# Patient Record
Sex: FEMALE | Race: BLACK OR AFRICAN AMERICAN | NOT HISPANIC OR LATINO | Employment: FULL TIME | URBAN - METROPOLITAN AREA
[De-identification: names, ages, dates, MRNs, and addresses within clinical notes are randomized per-mention and may not be internally consistent; named-entity substitution may affect disease eponyms.]

---

## 2023-04-19 LAB — POTASSIUM (MMOL/L) IN SER/PLAS: 4.4 MMOL/L (ref 3.5–5.3)

## 2023-06-27 ENCOUNTER — OFFICE VISIT (OUTPATIENT)
Dept: PRIMARY CARE | Facility: CLINIC | Age: 53
End: 2023-06-27
Payer: COMMERCIAL

## 2023-06-27 ENCOUNTER — LAB (OUTPATIENT)
Dept: LAB | Facility: LAB | Age: 53
End: 2023-06-27
Payer: COMMERCIAL

## 2023-06-27 ENCOUNTER — APPOINTMENT (OUTPATIENT)
Dept: PRIMARY CARE | Facility: CLINIC | Age: 53
End: 2023-06-27
Payer: COMMERCIAL

## 2023-06-27 VITALS
BODY MASS INDEX: 28.25 KG/M2 | SYSTOLIC BLOOD PRESSURE: 161 MMHG | HEIGHT: 67 IN | HEART RATE: 103 BPM | WEIGHT: 180 LBS | DIASTOLIC BLOOD PRESSURE: 93 MMHG | OXYGEN SATURATION: 97 % | TEMPERATURE: 97.5 F

## 2023-06-27 DIAGNOSIS — E11.9 TYPE 2 DIABETES MELLITUS WITHOUT COMPLICATION, WITHOUT LONG-TERM CURRENT USE OF INSULIN (MULTI): ICD-10-CM

## 2023-06-27 DIAGNOSIS — K21.9 GERD WITHOUT ESOPHAGITIS: ICD-10-CM

## 2023-06-27 DIAGNOSIS — M15.9 GENERALIZED OSTEOARTHRITIS OF HAND: Primary | ICD-10-CM

## 2023-06-27 DIAGNOSIS — R07.89 ATYPICAL CHEST PAIN: ICD-10-CM

## 2023-06-27 DIAGNOSIS — E78.49 OTHER HYPERLIPIDEMIA: ICD-10-CM

## 2023-06-27 DIAGNOSIS — Z00.00 ENCOUNTER FOR ROUTINE ADULT HEALTH EXAMINATION WITHOUT ABNORMAL FINDINGS: ICD-10-CM

## 2023-06-27 DIAGNOSIS — R35.0 URINARY FREQUENCY: ICD-10-CM

## 2023-06-27 DIAGNOSIS — K58.9 IRRITABLE BOWEL SYNDROME WITHOUT DIARRHEA: ICD-10-CM

## 2023-06-27 DIAGNOSIS — M79.7 FIBROMYALGIA: ICD-10-CM

## 2023-06-27 PROCEDURE — 3077F SYST BP >= 140 MM HG: CPT | Performed by: INTERNAL MEDICINE

## 2023-06-27 PROCEDURE — 99215 OFFICE O/P EST HI 40 MIN: CPT | Performed by: INTERNAL MEDICINE

## 2023-06-27 PROCEDURE — 1036F TOBACCO NON-USER: CPT | Performed by: INTERNAL MEDICINE

## 2023-06-27 PROCEDURE — 3080F DIAST BP >= 90 MM HG: CPT | Performed by: INTERNAL MEDICINE

## 2023-06-27 PROCEDURE — 81003 URINALYSIS AUTO W/O SCOPE: CPT

## 2023-06-27 PROCEDURE — 82043 UR ALBUMIN QUANTITATIVE: CPT

## 2023-06-27 PROCEDURE — 36415 COLL VENOUS BLD VENIPUNCTURE: CPT

## 2023-06-27 PROCEDURE — 83735 ASSAY OF MAGNESIUM: CPT

## 2023-06-27 PROCEDURE — 84443 ASSAY THYROID STIM HORMONE: CPT

## 2023-06-27 PROCEDURE — 82607 VITAMIN B-12: CPT

## 2023-06-27 PROCEDURE — 83036 HEMOGLOBIN GLYCOSYLATED A1C: CPT

## 2023-06-27 PROCEDURE — 82570 ASSAY OF URINE CREATININE: CPT

## 2023-06-27 PROCEDURE — 85025 COMPLETE CBC W/AUTO DIFF WBC: CPT

## 2023-06-27 PROCEDURE — 80053 COMPREHEN METABOLIC PANEL: CPT

## 2023-06-27 RX ORDER — FOLIC ACID 1 MG/1
TABLET ORAL
COMMUNITY

## 2023-06-27 RX ORDER — FEXOFENADINE HCL 60 MG
60 TABLET ORAL DAILY
COMMUNITY

## 2023-06-27 RX ORDER — CALCIUM CARBONATE/VITAMIN D3 600MG-5MCG
1 TABLET ORAL DAILY
COMMUNITY

## 2023-06-27 RX ORDER — FLUTICASONE PROPIONATE 50 MCG
1 SPRAY, SUSPENSION (ML) NASAL DAILY
COMMUNITY
End: 2023-12-19 | Stop reason: WASHOUT

## 2023-06-27 RX ORDER — VIT C/E/ZN/COPPR/LUTEIN/ZEAXAN 250MG-90MG
3 CAPSULE ORAL
COMMUNITY

## 2023-06-27 RX ORDER — ATORVASTATIN CALCIUM 20 MG/1
20 TABLET, FILM COATED ORAL DAILY
COMMUNITY
End: 2023-10-03 | Stop reason: SDUPTHER

## 2023-06-27 RX ORDER — DICYCLOMINE HYDROCHLORIDE 10 MG/1
1 CAPSULE ORAL 3 TIMES DAILY
COMMUNITY
Start: 2015-11-25 | End: 2023-10-03 | Stop reason: SDUPTHER

## 2023-06-27 RX ORDER — MULTIVITAMIN
1 TABLET ORAL DAILY
COMMUNITY
End: 2023-12-19 | Stop reason: WASHOUT

## 2023-06-27 RX ORDER — LANOLIN ALCOHOL/MO/W.PET/CERES
100 CREAM (GRAM) TOPICAL DAILY
COMMUNITY

## 2023-06-27 RX ORDER — SPIRONOLACTONE 50 MG/1
1 TABLET, FILM COATED ORAL DAILY
COMMUNITY
Start: 2017-11-30 | End: 2023-11-06

## 2023-06-27 NOTE — PATIENT INSTRUCTIONS
It was a pleasure to see you today! Here is a list of things we have discussed and to follow up on:    For your fibromyalgia - I recommend daily low impact aerobic exercises. Goal is 30-60 minutes per day, start with 5-10 minutes per day   For your diabetes - we will repeat your bloodwork and refer you back to Delia Iraheta   Hand pain - try VOLTAREN gel, this can be readily found over the counter, I have you to occupational therapy as well   For urinary frequency - I have referred you to urogynecology.   For chest pain - continue to monitor your symptoms and let me know if your symptoms change.   For your leg pain - I do not suspect a blood clot, let me know if your symptoms worsen or change.   Labs in anticipation for next visit in August.
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
## 2023-06-27 NOTE — ASSESSMENT & PLAN NOTE
No abnormalities appreciated in right shoulder, suspect her symptoms likely related to fibormyalgia, evidently seen by Dr. Goldstein in the past.   - Recommend low impact aerobic exercises, declines medical management at this time.

## 2023-06-28 LAB
ALANINE AMINOTRANSFERASE (SGPT) (U/L) IN SER/PLAS: 39 U/L (ref 7–45)
ALBUMIN (G/DL) IN SER/PLAS: 5.3 G/DL (ref 3.4–5)
ALBUMIN (MG/L) IN URINE: 8.2 MG/L
ALBUMIN/CREATININE (UG/MG) IN URINE: 23.7 UG/MG CRT (ref 0–30)
ALKALINE PHOSPHATASE (U/L) IN SER/PLAS: 81 U/L (ref 33–110)
ANION GAP IN SER/PLAS: 17 MMOL/L (ref 10–20)
APPEARANCE, URINE: CLEAR
ASPARTATE AMINOTRANSFERASE (SGOT) (U/L) IN SER/PLAS: 31 U/L (ref 9–39)
BASOPHILS (10*3/UL) IN BLOOD BY AUTOMATED COUNT: 0.03 X10E9/L (ref 0–0.1)
BASOPHILS/100 LEUKOCYTES IN BLOOD BY AUTOMATED COUNT: 0.4 % (ref 0–2)
BILIRUBIN TOTAL (MG/DL) IN SER/PLAS: 0.4 MG/DL (ref 0–1.2)
BILIRUBIN, URINE: NEGATIVE
BLOOD, URINE: NEGATIVE
CALCIUM (MG/DL) IN SER/PLAS: 10.4 MG/DL (ref 8.6–10.6)
CARBON DIOXIDE, TOTAL (MMOL/L) IN SER/PLAS: 25 MMOL/L (ref 21–32)
CHLORIDE (MMOL/L) IN SER/PLAS: 102 MMOL/L (ref 98–107)
COBALAMIN (VITAMIN B12) (PG/ML) IN SER/PLAS: 1994 PG/ML (ref 211–911)
COLOR, URINE: NORMAL
CREATININE (MG/DL) IN SER/PLAS: 0.69 MG/DL (ref 0.5–1.05)
CREATININE (MG/DL) IN URINE: 34.6 MG/DL (ref 20–320)
EOSINOPHILS (10*3/UL) IN BLOOD BY AUTOMATED COUNT: 0.1 X10E9/L (ref 0–0.7)
EOSINOPHILS/100 LEUKOCYTES IN BLOOD BY AUTOMATED COUNT: 1.4 % (ref 0–6)
ERYTHROCYTE DISTRIBUTION WIDTH (RATIO) BY AUTOMATED COUNT: 12.8 % (ref 11.5–14.5)
ERYTHROCYTE MEAN CORPUSCULAR HEMOGLOBIN CONCENTRATION (G/DL) BY AUTOMATED: 31.4 G/DL (ref 32–36)
ERYTHROCYTE MEAN CORPUSCULAR VOLUME (FL) BY AUTOMATED COUNT: 98 FL (ref 80–100)
ERYTHROCYTES (10*6/UL) IN BLOOD BY AUTOMATED COUNT: 4.49 X10E12/L (ref 4–5.2)
ESTIMATED AVERAGE GLUCOSE FOR HBA1C: 169 MG/DL
GFR FEMALE: >90 ML/MIN/1.73M2
GLUCOSE (MG/DL) IN SER/PLAS: 132 MG/DL (ref 74–99)
GLUCOSE, URINE: NEGATIVE MG/DL
HEMATOCRIT (%) IN BLOOD BY AUTOMATED COUNT: 43.9 % (ref 36–46)
HEMOGLOBIN (G/DL) IN BLOOD: 13.8 G/DL (ref 12–16)
HEMOGLOBIN A1C/HEMOGLOBIN TOTAL IN BLOOD: 7.5 %
IMMATURE GRANULOCYTES/100 LEUKOCYTES IN BLOOD BY AUTOMATED COUNT: 0.3 % (ref 0–0.9)
KETONES, URINE: NEGATIVE MG/DL
LEUKOCYTE ESTERASE, URINE: NEGATIVE
LEUKOCYTES (10*3/UL) IN BLOOD BY AUTOMATED COUNT: 7.3 X10E9/L (ref 4.4–11.3)
LYMPHOCYTES (10*3/UL) IN BLOOD BY AUTOMATED COUNT: 3.54 X10E9/L (ref 1.2–4.8)
LYMPHOCYTES/100 LEUKOCYTES IN BLOOD BY AUTOMATED COUNT: 48.3 % (ref 13–44)
MAGNESIUM (MG/DL) IN SER/PLAS: 2.08 MG/DL (ref 1.6–2.4)
MONOCYTES (10*3/UL) IN BLOOD BY AUTOMATED COUNT: 0.5 X10E9/L (ref 0.1–1)
MONOCYTES/100 LEUKOCYTES IN BLOOD BY AUTOMATED COUNT: 6.8 % (ref 2–10)
NEUTROPHILS (10*3/UL) IN BLOOD BY AUTOMATED COUNT: 3.14 X10E9/L (ref 1.2–7.7)
NEUTROPHILS/100 LEUKOCYTES IN BLOOD BY AUTOMATED COUNT: 42.8 % (ref 40–80)
NITRITE, URINE: NEGATIVE
NRBC (PER 100 WBCS) BY AUTOMATED COUNT: 0 /100 WBC (ref 0–0)
PH, URINE: 5 (ref 5–8)
PLATELETS (10*3/UL) IN BLOOD AUTOMATED COUNT: 375 X10E9/L (ref 150–450)
POTASSIUM (MMOL/L) IN SER/PLAS: 4.3 MMOL/L (ref 3.5–5.3)
PROTEIN TOTAL: 8.7 G/DL (ref 6.4–8.2)
PROTEIN, URINE: NEGATIVE MG/DL
SODIUM (MMOL/L) IN SER/PLAS: 140 MMOL/L (ref 136–145)
SPECIFIC GRAVITY, URINE: 1.01 (ref 1–1.03)
THYROTROPIN (MIU/L) IN SER/PLAS BY DETECTION LIMIT <= 0.05 MIU/L: 1.61 MIU/L (ref 0.44–3.98)
UREA NITROGEN (MG/DL) IN SER/PLAS: 14 MG/DL (ref 6–23)
UROBILINOGEN, URINE: <2 MG/DL (ref 0–1.9)

## 2023-06-28 NOTE — ASSESSMENT & PLAN NOTE
On dicyclomine uses sparingly but helps with cramping discomfort, no changes in bowel movements, unclear if diagnosis of IBS.

## 2023-08-23 ENCOUNTER — APPOINTMENT (OUTPATIENT)
Dept: PRIMARY CARE | Facility: CLINIC | Age: 53
End: 2023-08-23
Payer: COMMERCIAL

## 2023-08-23 ENCOUNTER — OFFICE VISIT (OUTPATIENT)
Dept: PRIMARY CARE | Facility: CLINIC | Age: 53
End: 2023-08-23
Payer: COMMERCIAL

## 2023-08-23 VITALS
HEART RATE: 84 BPM | DIASTOLIC BLOOD PRESSURE: 67 MMHG | TEMPERATURE: 98.8 F | BODY MASS INDEX: 29.75 KG/M2 | WEIGHT: 187.13 LBS | SYSTOLIC BLOOD PRESSURE: 108 MMHG

## 2023-08-23 DIAGNOSIS — R79.89 LIVER FUNCTION TEST ABNORMALITY: ICD-10-CM

## 2023-08-23 DIAGNOSIS — Z12.31 ENCOUNTER FOR SCREENING MAMMOGRAM FOR MALIGNANT NEOPLASM OF BREAST: ICD-10-CM

## 2023-08-23 DIAGNOSIS — E11.9 TYPE 2 DIABETES MELLITUS WITHOUT COMPLICATION, WITHOUT LONG-TERM CURRENT USE OF INSULIN (MULTI): Primary | ICD-10-CM

## 2023-08-23 PROCEDURE — 3074F SYST BP LT 130 MM HG: CPT | Performed by: INTERNAL MEDICINE

## 2023-08-23 PROCEDURE — 1036F TOBACCO NON-USER: CPT | Performed by: INTERNAL MEDICINE

## 2023-08-23 PROCEDURE — 99214 OFFICE O/P EST MOD 30 MIN: CPT | Performed by: INTERNAL MEDICINE

## 2023-08-23 PROCEDURE — 3051F HG A1C>EQUAL 7.0%<8.0%: CPT | Performed by: INTERNAL MEDICINE

## 2023-08-23 PROCEDURE — 3078F DIAST BP <80 MM HG: CPT | Performed by: INTERNAL MEDICINE

## 2023-08-23 RX ORDER — DULAGLUTIDE 0.75 MG/.5ML
0.75 INJECTION, SOLUTION SUBCUTANEOUS
Qty: 2 ML | Refills: 11 | Status: SHIPPED | OUTPATIENT
Start: 2023-08-23 | End: 2023-09-13 | Stop reason: DRUGHIGH

## 2023-08-23 NOTE — PATIENT INSTRUCTIONS
It was a pleasure to see you today! Here is a list of things we have discussed and to follow up on:    Call Delia Iraheta for another appointment - call [phone] to schedule the appointment.   WE will be starting Trulicity today at the lowest dose of 0.75mg weekly. Please  this prescription from [facility] pharmacy. I have also referred you to the pharmacy team to assist with insurance coverage and uptitration of the dose of trulicity as tolerated.   Followup in one month's time with bloodwork one week prior to that visit.

## 2023-08-23 NOTE — ASSESSMENT & PLAN NOTE
Worsening of A1C with significant concerns regards to this. Will encourage followup with nutritionist   Start Trulicity, no known contraindications and refer to pharmacy to allow for dosage titration. Potential side effects and management expectations reviewed.   Will followup in one month's time with labs prior.
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
## 2023-08-23 NOTE — ASSESSMENT & PLAN NOTE
With worsening of A1C to 7.5.% despite lifestyle modifications.   Interested in more aggressive management.   Will ensure she has a followup appointment with Delia Iraheta of nutrition, further risk stratification   - Refer to kari, screen for albuminuria, already on statin, no neuropathy.

## 2023-08-25 ENCOUNTER — TELEMEDICINE (OUTPATIENT)
Dept: PHARMACY | Facility: HOSPITAL | Age: 53
End: 2023-08-25
Payer: COMMERCIAL

## 2023-08-25 DIAGNOSIS — E11.9 TYPE 2 DIABETES MELLITUS WITHOUT COMPLICATION, WITHOUT LONG-TERM CURRENT USE OF INSULIN (MULTI): Primary | ICD-10-CM

## 2023-08-25 ASSESSMENT — ENCOUNTER SYMPTOMS: DIABETIC ASSOCIATED SYMPTOMS: 0

## 2023-08-25 NOTE — ASSESSMENT & PLAN NOTE
Motivated to make diet and lifestyle changes in combination with GLP-1 therapy. Reports 40 lbs weight loss from 7919-1453 with an A1c down to 6.1%. Life stressors have made it difficult to manage diet. Plans to meet with dietician again. Discussed in depth the mechanism, risks/benefits and expectations as well as titration schedule of Trulicity and answered all patient questions to the best of my ability. Discussed the benefits of good glycemic control in preventing the micro and macrovascular complications of diabetes.  Goal A1c <7%. No HTN Hx, lipids at goal on moderate intensity statin.    PLAN:  Patient will take 1st dose of Trulicity today. Will follow up in 3 weeks to assess tolerance and titrate as appropriate.

## 2023-08-25 NOTE — PROGRESS NOTES
Subjective   Patient ID: Lashone D Lemon is a 52 y.o. female who presents for Diabetes.    Referring Provider: Dian Canales DO     Diabetes  She presents for her initial diabetic visit. She has type 2 diabetes mellitus. There are no hypoglycemic associated symptoms. There are no diabetic associated symptoms. There are no hypoglycemic complications. There are no diabetic complications. Risk factors for coronary artery disease include family history and diabetes mellitus. Current diabetic treatment includes diet (GLP-1 Therapy). Her weight is stable. She is following a diabetic (Stress had made maintaining a good diet difficult) diet. When asked about meal planning, she reported none. She has had a previous visit with a dietitian. (Not testing at home) She does not see a podiatrist.  Between 5009-4436 lost 40 lbs with A1c down to 6.1%    Review of Systems    Objective     There were no vitals taken for this visit.     Labs  Lab Results   Component Value Date    BILITOT 0.4 06/27/2023    CALCIUM 10.4 06/27/2023    CO2 25 06/27/2023     06/27/2023    CREATININE 0.69 06/27/2023    GLUCOSE 132 (H) 06/27/2023    ALKPHOS 81 06/27/2023    K 4.3 06/27/2023    PROT 8.7 (H) 06/27/2023     06/27/2023    AST 31 06/27/2023    ALT 39 06/27/2023    BUN 14 06/27/2023    ANIONGAP 17 06/27/2023    MG 2.08 06/27/2023    PHOS 3.7 05/19/2018    ALBUMIN 5.3 (H) 06/27/2023    GFRF >90 06/27/2023     Lab Results   Component Value Date    TRIG 43 12/15/2022    CHOL 149 12/15/2022    HDL 67.2 12/15/2022     Lab Results   Component Value Date    HGBA1C 7.5 (A) 06/27/2023       Current Outpatient Medications on File Prior to Visit   Medication Sig Dispense Refill    Aldactone 50 mg tablet Take 1 tablet (50 mg) by mouth once daily.      atorvastatin (Lipitor) 20 mg tablet Take 1 tablet (20 mg) by mouth once daily.      calcium carbonate-vitamin D3 600 mg-5 mcg (200 unit) tablet Take 1 tablet by mouth once daily.       cholecalciferol (Vitamin D-3) 25 MCG (1000 UT) capsule Take 3 tablets by mouth once daily.      cyanocobalamin (Vitamin B-12) 1,000 mcg tablet Take 100 mcg by mouth once daily.      dicyclomine (Bentyl) 10 mg capsule Take 1 capsule (10 mg) by mouth 3 times a day.      dulaglutide (Trulicity) 0.75 mg/0.5 mL pen injector Inject 0.75 mg under the skin 1 (one) time per week. 2 mL 11    fexofenadine (Allegra) 60 mg tablet Take 1 tablet (60 mg) by mouth once daily.      fluticasone (Flonase) 50 mcg/actuation nasal spray Administer 1 spray into each nostril once daily. Shake gently. Before first use, prime pump. After use, clean tip and replace cap.      folic acid (Folvite) 1 mg tablet Take by mouth.      multivitamin (Multiple Vitamins) tablet Take 1 tablet by mouth once daily.      omeprazole (PriLOSEC) 10 mg packet for oral suspension Take by mouth.       No current facility-administered medications on file prior to visit.        Assessment/Plan   Problem List Items Addressed This Visit       Diabetes mellitus (CMS/[facility]) - Primary     Motivated to make diet and lifestyle changes in combination with GLP-1 therapy. Reports 40 lbs weight loss from 6749-7239 with an A1c down to 6.1%. Life stressors have made it difficult to manage diet. Plans to meet with dietician again. Discussed in depth the mechanism, risks/benefits and expectations as well as titration schedule of Trulicity and answered all patient questions to the best of my ability. Discussed the benefits of good glycemic control in preventing the micro and macrovascular complications of diabetes.  Goal A1c <7%. No HTN Hx, lipids at goal on moderate intensity statin.    PLAN:  Patient will take 1st dose of Trulicity today. Will follow up in 3 weeks to assess tolerance and titrate as appropriate.         Relevant Orders    Follow Up In [facility] - Pharmacy   Follow up: 9/15/23 @11am    Ethan Duenas, Madhav    Continue all meds under the continuation  of care with the referring provider and clinical pharmacy team.

## 2023-09-13 ENCOUNTER — TELEMEDICINE (OUTPATIENT)
Dept: PHARMACY | Facility: HOSPITAL | Age: 53
End: 2023-09-13
Payer: COMMERCIAL

## 2023-09-13 DIAGNOSIS — E11.9 TYPE 2 DIABETES MELLITUS WITHOUT COMPLICATION, WITHOUT LONG-TERM CURRENT USE OF INSULIN (MULTI): Primary | ICD-10-CM

## 2023-09-13 RX ORDER — DULAGLUTIDE 1.5 MG/.5ML
1.5 INJECTION, SOLUTION SUBCUTANEOUS
Qty: 2 ML | Refills: 1 | Status: SHIPPED | OUTPATIENT
Start: 2023-09-13 | End: 2023-10-09 | Stop reason: DRUGHIGH

## 2023-09-13 NOTE — PROGRESS NOTES
Subjective   Patient ID: Lashone D Lemon is a 52 y.o. female who presents for Diabetes.    Referring Provider: DO PADDY Mock  Diabetes  She presents for her initial diabetic visit. She has type 2 diabetes mellitus. There are no hypoglycemic associated symptoms. There are no diabetic associated symptoms. There are no hypoglycemic complications. There are no diabetic complications. Risk factors for coronary artery disease include family history and diabetes mellitus. Current diabetic treatment includes diet (GLP-1 Therapy). Her weight is stable. She is following a diabetic (Stress had made maintaining a good diet difficult) diet. When asked about meal planning, she reported none. She has had a previous visit with a dietitian. (Not testing at home) She does not see a podiatrist.  Between 0800-2877 lost 40 lbs with A1c down to 6.1%  Review of Systems    Objective     There were no vitals taken for this visit.     Labs  Lab Results   Component Value Date    BILITOT 0.4 06/27/2023    CALCIUM 10.4 06/27/2023    CO2 25 06/27/2023     06/27/2023    CREATININE 0.69 06/27/2023    GLUCOSE 132 (H) 06/27/2023    ALKPHOS 81 06/27/2023    K 4.3 06/27/2023    PROT 8.7 (H) 06/27/2023     06/27/2023    AST 31 06/27/2023    ALT 39 06/27/2023    BUN 14 06/27/2023    ANIONGAP 17 06/27/2023    MG 2.08 06/27/2023    PHOS 3.7 05/19/2018    ALBUMIN 5.3 (H) 06/27/2023    GFRF >90 06/27/2023     Lab Results   Component Value Date    TRIG 43 12/15/2022    CHOL 149 12/15/2022    HDL 67.2 12/15/2022     Lab Results   Component Value Date    HGBA1C 7.5 (A) 06/27/2023       Current Outpatient Medications on File Prior to Visit   Medication Sig Dispense Refill    Aldactone 50 mg tablet Take 1 tablet (50 mg) by mouth once daily.      atorvastatin (Lipitor) 20 mg tablet Take 1 tablet (20 mg) by mouth once daily.      calcium carbonate-vitamin D3 600 mg-5 mcg (200 unit) tablet Take 1 tablet by mouth once daily.       cholecalciferol (Vitamin D-3) 25 MCG (1000 UT) capsule Take 3 tablets by mouth once daily.      cyanocobalamin (Vitamin B-12) 1,000 mcg tablet Take 100 mcg by mouth once daily.      dicyclomine (Bentyl) 10 mg capsule Take 1 capsule (10 mg) by mouth 3 times a day.      dulaglutide (Trulicity) 0.75 mg/0.5 mL pen injector Inject 0.75 mg under the skin 1 (one) time per week. 2 mL 11    fexofenadine (Allegra) 60 mg tablet Take 1 tablet (60 mg) by mouth once daily.      fluticasone (Flonase) 50 mcg/actuation nasal spray Administer 1 spray into each nostril once daily. Shake gently. Before first use, prime pump. After use, clean tip and replace cap.      folic acid (Folvite) 1 mg tablet Take by mouth.      multivitamin (Multiple Vitamins) tablet Take 1 tablet by mouth once daily.      omeprazole (PriLOSEC) 10 mg packet for oral suspension Take by mouth.       No current facility-administered medications on file prior to visit.        Assessment/Plan   Problem List Items Addressed This Visit       Diabetes mellitus (CMS/[facility]) - Primary     Last A1c 7.5% which is above goal <7%. Consider more stringent goal <6.5%. Patient has since made significant diet changes and started GLP-1 therapy.   Doing very well with Trulicity starter dose. Noticing some increased satiety. Minor GI discomfort with the first 2 doses but now resolved. Further discussed strategies for mitigating GI side effects.  Patient does not want to test blood sugars or monitor weight at home as this intensifies her anxiety.    PLAN:  Increase Trulicity to 1.5 mg once weekly. New prescription sent to preferred pharmacy.    PATIENT EDUCATION/DISCUSSION:  - Counseled patient on MOA, expectations, side effects, duration of therapy, contraindications, administration, and monitoring parameters  - Answered all patient questions and concerns          Follow up: 4 weeks    Ethan Duenas, Madhav    Continue all meds under the continuation of care with the referring  provider and clinical pharmacy team.

## 2023-09-13 NOTE — ASSESSMENT & PLAN NOTE
Last A1c 7.5% which is above goal <7%. Consider more stringent goal <6.5%. Patient has since made significant diet changes and started GLP-1 therapy.   Doing very well with Trulicity starter dose. Noticing some increased satiety. Minor GI discomfort with the first 2 doses but now resolved. Further discussed strategies for mitigating GI side effects.  Patient does not want to test blood sugars or monitor weight at home as this intensifies her anxiety.    PLAN:  Increase Trulicity to 1.5 mg once weekly. New prescription sent to preferred pharmacy.    PATIENT EDUCATION/DISCUSSION:  - Counseled patient on MOA, expectations, side effects, duration of therapy, contraindications, administration, and monitoring parameters  - Answered all patient questions and concerns

## 2023-09-15 ENCOUNTER — APPOINTMENT (OUTPATIENT)
Dept: PHARMACY | Facility: HOSPITAL | Age: 53
End: 2023-09-15
Payer: COMMERCIAL

## 2023-10-03 ENCOUNTER — OFFICE VISIT (OUTPATIENT)
Dept: PRIMARY CARE | Facility: CLINIC | Age: 53
End: 2023-10-03
Payer: COMMERCIAL

## 2023-10-03 VITALS
HEART RATE: 84 BPM | BODY MASS INDEX: 28.98 KG/M2 | WEIGHT: 182.25 LBS | DIASTOLIC BLOOD PRESSURE: 79 MMHG | SYSTOLIC BLOOD PRESSURE: 115 MMHG

## 2023-10-03 DIAGNOSIS — E78.49 OTHER HYPERLIPIDEMIA: ICD-10-CM

## 2023-10-03 DIAGNOSIS — Z00.00 ENCOUNTER FOR PREVENTATIVE ADULT HEALTH CARE EXAMINATION: ICD-10-CM

## 2023-10-03 DIAGNOSIS — K58.9 IRRITABLE BOWEL SYNDROME, UNSPECIFIED TYPE: ICD-10-CM

## 2023-10-03 DIAGNOSIS — E11.9 TYPE 2 DIABETES MELLITUS WITHOUT COMPLICATION, WITHOUT LONG-TERM CURRENT USE OF INSULIN (MULTI): Primary | ICD-10-CM

## 2023-10-03 DIAGNOSIS — Z01.84 IMMUNITY STATUS TESTING: ICD-10-CM

## 2023-10-03 PROCEDURE — 3051F HG A1C>EQUAL 7.0%<8.0%: CPT | Performed by: INTERNAL MEDICINE

## 2023-10-03 PROCEDURE — 1036F TOBACCO NON-USER: CPT | Performed by: INTERNAL MEDICINE

## 2023-10-03 PROCEDURE — 3074F SYST BP LT 130 MM HG: CPT | Performed by: INTERNAL MEDICINE

## 2023-10-03 PROCEDURE — 3078F DIAST BP <80 MM HG: CPT | Performed by: INTERNAL MEDICINE

## 2023-10-03 PROCEDURE — 99214 OFFICE O/P EST MOD 30 MIN: CPT | Performed by: INTERNAL MEDICINE

## 2023-10-03 RX ORDER — DICYCLOMINE HYDROCHLORIDE 10 MG/1
10 CAPSULE ORAL 3 TIMES DAILY
Qty: 90 CAPSULE | Refills: 1 | Status: SHIPPED | OUTPATIENT
Start: 2023-10-03 | End: 2023-12-19 | Stop reason: SDUPTHER

## 2023-10-03 RX ORDER — ATORVASTATIN CALCIUM 20 MG/1
20 TABLET, FILM COATED ORAL DAILY
Qty: 90 TABLET | Refills: 3 | Status: SHIPPED | OUTPATIENT
Start: 2023-10-03 | End: 2023-12-19 | Stop reason: SDUPTHER

## 2023-10-03 NOTE — PATIENT INSTRUCTIONS
It was a pleasure to see you today! Here is a list of things we have discussed and to follow up on:    CONGRATULATIONS on healthy lifestyle and all that YOU have done!!!   Continue to see Ethan and uptitrate the trulicity as tolerated.   Followup as scheduled in 3 months, labs 1 week prior. You do NOT need to be fasting prior to the labs.
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
## 2023-10-03 NOTE — ASSESSMENT & PLAN NOTE
Has been following with pharmacy team Ethan now on Trulicity with uptritrated dose to 1.5mg, noted only slight nausea but not enough to warrant discontinuation of the medication.   Last A1C 7.5% repeat today 6.9%!   On: Trulicity with plans on uptitrating as tolerated.  Albuminuria: 6/23 none    Optho seen by Dr. Behr upcoming appointment scheduled in November   Podiatry: pending with Dr. Isaias Noguera   Statin on atorvastatin   Immunizations: UTD

## 2023-10-05 PROBLEM — L82.1 OTHER SEBORRHEIC KERATOSIS: Status: ACTIVE | Noted: 2023-06-06

## 2023-10-05 PROBLEM — G89.29 CHRONIC HEADACHES: Status: ACTIVE | Noted: 2023-10-05

## 2023-10-05 PROBLEM — H53.19 PHOTOPSIA: Status: ACTIVE | Noted: 2023-10-05

## 2023-10-05 PROBLEM — H25.13 CATARACT, NUCLEAR SCLEROTIC, BOTH EYES: Status: ACTIVE | Noted: 2023-10-05

## 2023-10-05 PROBLEM — L70.0 ACNE VULGARIS: Status: ACTIVE | Noted: 2023-06-06

## 2023-10-05 PROBLEM — L91.8 OTHER HYPERTROPHIC DISORDERS OF THE SKIN: Status: ACTIVE | Noted: 2023-06-06

## 2023-10-05 PROBLEM — D75.89 MACROCYTOSIS: Status: ACTIVE | Noted: 2023-10-05

## 2023-10-05 PROBLEM — L90.5 SCAR CONDITION AND FIBROSIS OF SKIN: Status: ACTIVE | Noted: 2023-06-06

## 2023-10-05 PROBLEM — L20.82 FLEXURAL ECZEMA: Status: ACTIVE | Noted: 2023-06-06

## 2023-10-05 PROBLEM — R07.89 CHEST PAIN, ATYPICAL: Status: ACTIVE | Noted: 2023-10-05

## 2023-10-05 PROBLEM — R51.9 CHRONIC HEADACHES: Status: ACTIVE | Noted: 2023-10-05

## 2023-10-05 PROBLEM — D48.5 NEOPLASM OF UNCERTAIN BEHAVIOR OF SKIN: Status: ACTIVE | Noted: 2023-06-06

## 2023-10-05 PROBLEM — L72.0 EPIDERMAL CYST: Status: ACTIVE | Noted: 2023-06-06

## 2023-10-05 RX ORDER — AMILORIDE HYDROCHLORIDE 5 MG/1
TABLET ORAL
COMMUNITY
Start: 2006-06-23 | End: 2023-12-19 | Stop reason: WASHOUT

## 2023-10-05 RX ORDER — CLINDAMYCIN PHOSPHATE AND BENZOYL PEROXIDE 10; 37.5 MG/G; MG/G
GEL TOPICAL
COMMUNITY
End: 2023-12-19 | Stop reason: WASHOUT

## 2023-10-05 RX ORDER — TRIAMCINOLONE ACETONIDE 1 MG/G
OINTMENT TOPICAL
COMMUNITY
Start: 2023-01-18 | End: 2023-12-19 | Stop reason: WASHOUT

## 2023-10-05 RX ORDER — CLINDAMYCIN PHOSPHATE AND BENZOYL PEROXIDE 10; 37.5 MG/G; MG/G
GEL TOPICAL
COMMUNITY
Start: 2023-01-22 | End: 2023-12-19 | Stop reason: WASHOUT

## 2023-10-05 RX ORDER — TRETINOIN 0.05 G/100G
GEL TOPICAL
COMMUNITY
Start: 2016-03-23 | End: 2023-12-19 | Stop reason: WASHOUT

## 2023-10-05 RX ORDER — CHOLECALCIFEROL (VITAMIN D3) 125 MCG
CAPSULE ORAL
COMMUNITY
End: 2023-12-19 | Stop reason: WASHOUT

## 2023-10-05 RX ORDER — FLUCONAZOLE 150 MG/1
150 TABLET ORAL WEEKLY
COMMUNITY
Start: 2023-07-31

## 2023-10-05 RX ORDER — CLINDAMYCIN PHOSPHATE, BENZOYL PEROXIDE 25; 10 MG/G; MG/G
GEL TOPICAL
COMMUNITY
Start: 2016-03-23 | End: 2023-12-19 | Stop reason: WASHOUT

## 2023-10-05 RX ORDER — TRIAMCINOLONE ACETONIDE OINTMENT USP, 0.05% 0.5 MG/G
OINTMENT TOPICAL
COMMUNITY
End: 2023-12-19 | Stop reason: WASHOUT

## 2023-10-05 RX ORDER — CLINDAMYCIN PHOSPHATE AND TRETINOIN GEL 1.2%/0.025% 10; .25 MG/G; MG/G
GEL TOPICAL
COMMUNITY
Start: 2016-09-15 | End: 2023-12-19 | Stop reason: WASHOUT

## 2023-10-05 RX ORDER — ERGOCALCIFEROL 1.25 MG/1
CAPSULE ORAL
COMMUNITY
End: 2023-12-19 | Stop reason: WASHOUT

## 2023-10-05 RX ORDER — ECHINACEA 400 MG
CAPSULE ORAL
COMMUNITY
End: 2023-12-19 | Stop reason: WASHOUT

## 2023-10-05 RX ORDER — CLINDAMYCIN PHOSPHATE 11.9 MG/ML
SOLUTION TOPICAL
COMMUNITY
Start: 2015-05-14 | End: 2023-12-19 | Stop reason: WASHOUT

## 2023-10-05 RX ORDER — TACROLIMUS 1 MG/G
OINTMENT TOPICAL
COMMUNITY
Start: 2014-11-13 | End: 2023-11-20

## 2023-10-05 RX ORDER — IBUPROFEN 200 MG
TABLET ORAL
COMMUNITY
End: 2023-12-19 | Stop reason: WASHOUT

## 2023-10-05 RX ORDER — AZELAIC ACID 0.15 G/G
GEL TOPICAL
COMMUNITY
Start: 2015-05-14 | End: 2023-12-19 | Stop reason: WASHOUT

## 2023-10-09 ENCOUNTER — PHARMACY VISIT (OUTPATIENT)
Dept: PHARMACY | Facility: CLINIC | Age: 53
End: 2023-10-09
Payer: MEDICARE

## 2023-10-09 ENCOUNTER — TELEMEDICINE (OUTPATIENT)
Dept: PHARMACY | Facility: HOSPITAL | Age: 53
End: 2023-10-09
Payer: COMMERCIAL

## 2023-10-09 ENCOUNTER — NUTRITION (OUTPATIENT)
Dept: NUTRITION | Facility: CLINIC | Age: 53
End: 2023-10-09
Payer: COMMERCIAL

## 2023-10-09 VITALS — BODY MASS INDEX: 28.7 KG/M2 | HEIGHT: 66 IN | WEIGHT: 178.57 LBS

## 2023-10-09 DIAGNOSIS — E11.9 TYPE 2 DIABETES MELLITUS WITHOUT COMPLICATION, WITHOUT LONG-TERM CURRENT USE OF INSULIN (MULTI): Primary | ICD-10-CM

## 2023-10-09 PROCEDURE — 97803 MED NUTRITION INDIV SUBSEQ: CPT | Performed by: DIETITIAN, REGISTERED

## 2023-10-09 PROCEDURE — RXMED WILLOW AMBULATORY MEDICATION CHARGE

## 2023-10-09 RX ORDER — DULAGLUTIDE 3 MG/.5ML
3 INJECTION, SOLUTION SUBCUTANEOUS
Qty: 2 ML | Refills: 2 | Status: SHIPPED | OUTPATIENT
Start: 2023-10-09 | End: 2024-01-03 | Stop reason: SDUPTHER

## 2023-10-09 ASSESSMENT — ENCOUNTER SYMPTOMS: DIABETIC ASSOCIATED SYMPTOMS: 0

## 2023-10-09 NOTE — ASSESSMENT & PLAN NOTE
PCP visit last week showed A1c down to 6.9% from 7.5% at last check.  Patient agreeable to targeting more stringent A1c goal of <6.5%.  Met with dietician today.  No AE, or complaints with Trulicity 1.5 mg weekly. No significant nausea. Reports increased satiety.    PLAN:  Increase Trulicity to 3 mg once weekly. May remain stable at this dose if continued success.  Repeat labs per PCP Dr. Canales

## 2023-10-09 NOTE — PROGRESS NOTES
Reason for Nutrition Visit:  Pt is a 52 y.o. female being seen at [facility] as she was referred for diabetes by Dr. Dian Canales on 6.27.23. 1. Type 2 diabetes mellitus without complication, without long-term current use of insulin (CMS/HCC) [E11.9]             Current Medical  Patient Active Problem List   Diagnosis    Diabetes mellitus (CMS/HCC)    GERD without esophagitis    IBS (irritable bowel syndrome)    Fibromyalgia    Other hyperlipidemia    Acne vulgaris    Cataract, nuclear sclerotic, both eyes    Epidermal cyst    Flexural eczema    Macrocytosis    Neoplasm of uncertain behavior of skin    Other seborrheic keratosis    Photopsia    Other hypertrophic disorders of the skin    Scar condition and fibrosis of skin    Chronic headaches    Chest pain, atypical      Current Outpatient Medications on File Prior to Visit   Medication Sig Dispense Refill    Aldactone 50 mg tablet Take 1 tablet (50 mg) by mouth once daily.      aMILoride (Midamor) 5 mg tablet one tablet daily      atorvastatin (Lipitor) 20 mg tablet Take 1 tablet (20 mg) by mouth once daily. 90 tablet 3    azelaic acid (Finacea) 15 % gel 1 application      calcium carbonate-vitamin D3 600 mg-5 mcg (200 unit) tablet Take 1 tablet by mouth once daily.      calcium citrate/vitamin D3 (CITRACAL PLUS D ORAL) Take 1 tablet by mouth daily Indications: vitamin deficiency.      cholecalciferol (Vitamin D-3) 25 MCG (1000 UT) capsule Take 3 tablets by mouth once daily.      clindamycin (Cleocin T) 1 % external solution 1 application      clindamycin-benzoyl peroxide (Acanya) gel 1 Application      clindamycin-benzoyl peroxide (Onexton) 1.2 %(1 % base) -3.75 % gel Onexton 1.2 % (1 % base)-3.75 % topical gel with pump      clindamycin-tretinoin (Ziana) gel APPLY TOPICALLY TO FACE EVERY NIGHT AT BEDTIME      cyanocobalamin (Vitamin B-12) 1,000 mcg tablet Take 100 mcg by mouth once daily.      dicyclomine (Bentyl) 10 mg capsule Take 1 capsule (10 mg) by mouth 3  times a day. 90 capsule 1    Echinacea purpurea aerial ext (Echinacea purp aerial part ext) 65 mg capsule Take 1 capsule by mouth daily Indications: vitamin deficiency.      elderberry fruit and flower 460-115 mg capsule Take by mouth as directed  Indications: vitamin deficiency      ergocalciferol (Vitamin D-2) 1.25 MG (06388 UT) capsule Take as directed      fexofenadine (Allegra) 60 mg tablet Take 1 tablet (60 mg) by mouth once daily.      fexofenadine/pseudoephedrine (ALLEGRA-D 24 HOUR ORAL) Take by mouth as directed  Indications: allergies      fluconazole (Diflucan) 150 mg tablet Take 1 tablet (150 mg) by mouth once a week.      fluticasone (Flonase) 50 mcg/actuation nasal spray Administer 1 spray into each nostril once daily. Shake gently. Before first use, prime pump. After use, clean tip and replace cap.      folic acid (Folvite) 1 mg tablet Take by mouth.      ibuprofen 200 mg tablet Take 800 mg by mouth every 6 hours as needed (for menstral cramps).      multivitamin (Multiple Vitamins) tablet Take 1 tablet by mouth once daily.      NON FORMULARY V-R Vitamin B-12 TABS      omeprazole (PriLOSEC) 10 mg packet for oral suspension Take by mouth.      Onexton 1.2 %(1 % base) -3.75 % gel with pump       tacrolimus (Protopic) 0.1 % ointment APPLY TOPICALLY AA OF NECK BID PRN      tretinoin (Atralin) 0.05 % gel 1 Application      triamcinolone (Kenalog) 0.1 % ointment       triamcinolone acetonide 0.05 % ointment Apply  to affected area.      [DISCONTINUED] atorvastatin (Lipitor) 20 mg tablet Take 1 tablet (20 mg) by mouth once daily.      [DISCONTINUED] dicyclomine (Bentyl) 10 mg capsule Take 1 capsule (10 mg) by mouth 3 times a day.      [DISCONTINUED] dulaglutide (Trulicity) 1.5 mg/0.5 mL pen injector injection Inject 1.5 mg under the skin 1 (one) time per week. 2 mL 1    [DISCONTINUED] dulaglutide (Trulicity) 1.5 mg/0.5 mL pen injector injection INJECT ONE PEN (1.5 MG) UNDER THE SKIN ONCE WEEKLY 2 mL 1     No  "current facility-administered medications on file prior to visit.      Anthropometrics:  Anthropometrics  Height: 167.6 cm (5' 5.98\")  Weight: 81 kg (178 lb 9.2 oz)  BMI (Calculated): 28.84   Weight change:     Lab Results   Component Value Date    HGBA1C 7.5 (A) 06/27/2023    CHOL 149 12/15/2022    LDLF 73 12/15/2022    TRIG 43 12/15/2022        Food and Nutrition Hx:  Pt has been seen by RDN since June 2008 for both wt loss and diabetes. Her lowest wt achieved was 147 lbs. She has not been seen since 2022. Pt reports since this time her blood glucose had increased to 7.5%. She reports she was driven to eat. She recently started Trulicity and this has helped curb cravings. She does not test her blood glucose.   Pt wakes up at 4:00 am. She eats 3 meals and 2-3  snacks per day.   24 Diet Recall:  Meal 1: Breakfast is at 6:00- 7:00 to include 0.5 peanut butter sandwich with a few slices of apple (CHO 25, protein 5)  Meal 2: Lunch is at 12:00- 1:00 to include apple with sometimes 0.5 tuna sandwich (CHO 20, protein 0-15)  Snack is at 4:00 to include sun chips and sometimes apple slices (CHO 15-30)  Meal 3: Dinner is at 6:00 to include 4 ounces of shrimp or salmon with a cup of green beans with up to a cup of long grain rice (CHO 35-50, protein 28)  Snack is at 8:00 to include peanut butter and bread (CHO 15)   Beverages: water throughout the day.     Allergies: None  Intolerance: None  Appetite: Good  Intake: >75%  GI Symptoms : None Frequency: absent  Swallowing Difficulty: No problems with swallowing  Dentition : own    Types of Activities: Mostly Sedentary  Duration: <30 minutes irregularly    Sleep duration/quality : 7+ hours and disrupted sleep  Sleep disorders: none    Supplements: Vitamin B12, Vitamin D, Calcium, and Folate daily    Feelings of Hunger?: Yes and will eat  Physical Feeling: Physically full  Binging: Never  Cravings: None  Energy Levels: Stable    Food Preparation: Patient  Cooking Skills/Barriers: " "None reported  Grocery Shopping: Patient    Eating Out Type: Dinner daily  Convenience Foods: Denies  rare    Nutrition Focused Physical Exam:    Performed/Deferred: Performed    Muscle Wasting:  Temporal: None  Shoulder: None  None  Interosseous Muscle: None  Quadriceps: None    Loss of Subcutaneous Fat:  Eyes: None  Perioral: None  Triceps: None  Chest: None    Other Physical Findings:  Hair: None  None  Mouth: None  Skin: None  Nails: None  none    Malnutrition Present: No    Estimated Energy Needs:  Energy Needs  Calculated Energy Needs Using Equations  Height: 167.6 cm (5' 5.98\")  Weight Used for Equation Calculations: 81 kg (178 lb 9.2 oz)  RelaywareRodri Boswell Equation (Overweight or Obese Patients): 1442  Equation Chosen to Use by RD: [employer] Andreia  Activity Factor: 1.2  Total Energy Needs: 1730  Estimated Energy Needs  Total Energy Estimated Needs (kCal): 1230 kCal  Method for Estimating Needs: MSJ x AF -500 calories for wt loss.     Nutrition Diagnosis:    Diagnosis Statement 1:  Diagnosis Status: New  Diagnosis : Altered nutrition related lab values  related to  change in meal structure and schedule in the presence of diabetes   as evidenced by  A1c is at 7.5%    Diagnosis Statement 2:  Diagnosis Status: New  Diagnosis : Inconsistent carbohydrate intake  related to  limited time to apply to meals coupled  as evidenced by  CHO at meals range between 15-50+ grams     Diagnosis Statement 3:   Diagnosis Status: New  Diagnosis : Imbalance of nutrients  related to  limited/omitted protein intake at times at meals and snacks when consuming CHO in the presence of diabetes  as evidenced by  protein can be omitted at meals and snacks and overall protein intake appears to be lower than recommendation    Nutrition Interventions:  Medical nutrition therapy was given for diabetes.   Nutrition Counseling: Motivational Interviewing  Coordination of Care: None    Nutrition Goals:  1,230 calories per day for 1 lb wt loss " "per week. CHO consistent meal plan with aim for 30-45 grams of CHO at meals and 15 grams at snacks to help reduce A1c. Heart healthy meal plan with a low saturated fat intake to <5 -6 % of energy (less than 8 grams of saturated fat per day), reduced intake of added sugars (<10% of total energy), 25 grams of fiber with intake of viscous fiber to 5 g/day to 10 g/day, plant sterols/stanols to 2 g/day, and 1.1 gram of omega three fatty acids per day since a statin is being taken. 1,500 mg sodium restriction per day.     Nutrition Recommendations:  Via teach back method patient verbalized understanding of the following topics:  1) Aim for three meals and 2-3 snacks per day. Strive to have breakfast within 1 -2 hours of waking to stabilize blood glucose. Aim for breakfast by 6:00 am, sanck at 10:00 am if needed, lunch at 12:00-1:00, snack at 4:00 if needed, dinner at 6:00 pm and bedtime snack at 8:30 pm. The bedtime snack can also help to stabilize blood glucose over night.   2) Strive to include protein at the meals and even snacks. Protein at meals can increase the metabolism too.  Protein at snacks can sustain energy, stabilize blood glucose, and provide more contentment. Protein foods include eggs, egg whites, cheese, nuts, nut butters, Greek yogurt, poultry, meat, fish, tofu, plant based protein powder, and/or plant based protein drinks.   3) Recommended pt to go back to the plate method for diabetes. The plate method was recommended to help portion foods at meals and to structure. Using a 9\" plate, recommended for 1/2 of the plate to include non-starchy vegetable and suggested to consume this first.  Recommended protein to be included but limited to 3 ounces at meals. Recommended 1/4 of the plate or 1 cup of a grain or starch with a fruit, milk or yogurt at meals.     Educational Handouts: Plate method for Diabetes (2022)     Delia Iraheta, MS, RDN, LD, KATHERINE   Advanced Practice Clinical " Dietitian  [email or web address]  Schedule line [phone]  Direct line [phone]     Readiness to Change : Good  Level of Understanding : Good  Anticipated Compliant : Good

## 2023-10-09 NOTE — PATIENT INSTRUCTIONS
"1) Aim for three meals and 2-3 snacks per day. Strive to have breakfast within 1 -2 hours of waking to stabilize blood glucose. Aim for breakfast by 6:00 am, sanck at 10:00 am if needed, lunch at 12:00-1:00, snack at 4:00 if needed, dinner at 6:00 pm and bedtime snack at 8:30 pm. The bedtime snack can also help to stabilize blood glucose over night.   2) Strive to include protein at the meals and even snacks. Protein at meals can increase the metabolism too.  Protein at snacks can sustain energy, stabilize blood glucose, and provide more contentment. Protein foods include eggs, egg whites, cheese, nuts, nut butters, Greek yogurt, poultry, meat, fish, tofu, plant based protein powder, and/or plant based protein drinks.   3) Recommended pt to go back to the plate method for diabetes. The plate method was recommended to help portion foods at meals and to structure. Using a 9\" plate, recommended for 1/2 of the plate to include non-starchy vegetable and suggested to consume this first.  Recommended protein to be included but limited to 3 ounces at meals. Recommended 1/4 of the plate or 1 cup of a grain or starch with a fruit, milk or yogurt at meals.     Educational Handouts: Plate method for Diabetes (2022)     Delia Iraheta, MS, RDN, LD, KATHERINE   Advanced Practice Clinical Dietitian  [email or web address]  Schedule line [phone]  Direct line [phone]   "

## 2023-10-09 NOTE — PROGRESS NOTES
Subjective   Patient ID: Lashone D Lemon is a 52 y.o. female who presents for Diabetes.    Referring Provider: Dian Canales DO     Diabetes  She presents for her follow-up diabetic visit. She has type 2 diabetes mellitus. Her disease course has been improving. There are no hypoglycemic associated symptoms. There are no diabetic associated symptoms. There are no hypoglycemic complications. There are no diabetic complications. Risk factors for coronary artery disease include diabetes mellitus, dyslipidemia, sedentary lifestyle and stress. Current diabetic treatments: GLP-1 RA. She is compliant with treatment all of the time. Her weight is stable. She is following a generally healthy diet. When asked about meal planning, she reported none. She has had a previous visit with a dietitian. Exercise: Not currently exercising. Her home blood glucose trend is decreasing steadily. (Not testing at home) She sees a podiatrist.      Review of Systems    Objective     There were no vitals taken for this visit.     Labs  Lab Results   Component Value Date    BILITOT 0.4 06/27/2023    CALCIUM 10.4 06/27/2023    CO2 25 06/27/2023     06/27/2023    CREATININE 0.69 06/27/2023    GLUCOSE 132 (H) 06/27/2023    ALKPHOS 81 06/27/2023    K 4.3 06/27/2023    PROT 8.7 (H) 06/27/2023     06/27/2023    AST 31 06/27/2023    ALT 39 06/27/2023    BUN 14 06/27/2023    ANIONGAP 17 06/27/2023    MG 2.08 06/27/2023    PHOS 3.7 05/19/2018    ALBUMIN 5.3 (H) 06/27/2023    GFRF >90 06/27/2023     Lab Results   Component Value Date    TRIG 43 12/15/2022    CHOL 149 12/15/2022    HDL 67.2 12/15/2022     Lab Results   Component Value Date    HGBA1C 7.5 (A) 06/27/2023       Current Outpatient Medications on File Prior to Visit   Medication Sig Dispense Refill    Aldactone 50 mg tablet Take 1 tablet (50 mg) by mouth once daily.      aMILoride (Midamor) 5 mg tablet one tablet daily      atorvastatin (Lipitor) 20 mg tablet Take 1 tablet (20 mg)  by mouth once daily. 90 tablet 3    azelaic acid (Finacea) 15 % gel 1 application      calcium carbonate-vitamin D3 600 mg-5 mcg (200 unit) tablet Take 1 tablet by mouth once daily.      calcium citrate/vitamin D3 (CITRACAL PLUS D ORAL) Take 1 tablet by mouth daily Indications: vitamin deficiency.      cholecalciferol (Vitamin D-3) 25 MCG (1000 UT) capsule Take 3 tablets by mouth once daily.      clindamycin (Cleocin T) 1 % external solution 1 application      clindamycin-benzoyl peroxide (Acanya) gel 1 Application      clindamycin-benzoyl peroxide (Onexton) 1.2 %(1 % base) -3.75 % gel Onexton 1.2 % (1 % base)-3.75 % topical gel with pump      clindamycin-tretinoin (Ziana) gel APPLY TOPICALLY TO FACE EVERY NIGHT AT BEDTIME      cyanocobalamin (Vitamin B-12) 1,000 mcg tablet Take 100 mcg by mouth once daily.      dicyclomine (Bentyl) 10 mg capsule Take 1 capsule (10 mg) by mouth 3 times a day. 90 capsule 1    Echinacea purpurea aerial ext (Echinacea purp aerial part ext) 65 mg capsule Take 1 capsule by mouth daily Indications: vitamin deficiency.      elderberry fruit and flower 460-115 mg capsule Take by mouth as directed  Indications: vitamin deficiency      ergocalciferol (Vitamin D-2) 1.25 MG (18032 UT) capsule Take as directed      fexofenadine (Allegra) 60 mg tablet Take 1 tablet (60 mg) by mouth once daily.      fexofenadine/pseudoephedrine (ALLEGRA-D 24 HOUR ORAL) Take by mouth as directed  Indications: allergies      fluconazole (Diflucan) 150 mg tablet Take 1 tablet (150 mg) by mouth once a week.      fluticasone (Flonase) 50 mcg/actuation nasal spray Administer 1 spray into each nostril once daily. Shake gently. Before first use, prime pump. After use, clean tip and replace cap.      folic acid (Folvite) 1 mg tablet Take by mouth.      ibuprofen 200 mg tablet Take 800 mg by mouth every 6 hours as needed (for menstral cramps).      multivitamin (Multiple Vitamins) tablet Take 1 tablet by mouth once daily.       NON FORMULARY V-R Vitamin B-12 TABS      omeprazole (PriLOSEC) 10 mg packet for oral suspension Take by mouth.      Onexton 1.2 %(1 % base) -3.75 % gel with pump       tacrolimus (Protopic) 0.1 % ointment APPLY TOPICALLY AA OF NECK BID PRN      tretinoin (Atralin) 0.05 % gel 1 Application      triamcinolone (Kenalog) 0.1 % ointment       triamcinolone acetonide 0.05 % ointment Apply  to affected area.      [DISCONTINUED] atorvastatin (Lipitor) 20 mg tablet Take 1 tablet (20 mg) by mouth once daily.      [DISCONTINUED] dicyclomine (Bentyl) 10 mg capsule Take 1 capsule (10 mg) by mouth 3 times a day.      [DISCONTINUED] dulaglutide (Trulicity) 1.5 mg/0.5 mL pen injector injection Inject 1.5 mg under the skin 1 (one) time per week. 2 mL 1    [DISCONTINUED] dulaglutide (Trulicity) 1.5 mg/0.5 mL pen injector injection INJECT ONE PEN (1.5 MG) UNDER THE SKIN ONCE WEEKLY 2 mL 1     No current facility-administered medications on file prior to visit.        Assessment/Plan   Problem List Items Addressed This Visit             ICD-10-CM    Diabetes mellitus (CMS/[facility]) - Primary E11.9     PCP visit last week showed A1c down to 6.9% from 7.5% at last check.  Patient agreeable to targeting more stringent A1c goal of <6.5%.  Met with dietician today.  No AE, or complaints with Trulicity 1.5 mg weekly. No significant nausea. Reports increased satiety.    PLAN:  Increase Trulicity to 3 mg once weekly. May remain stable at this dose if continued success.  Repeat labs per PCP Dr. Canales           Relevant Medications    dulaglutide (Trulicity) 3 mg/0.5 mL pen injector    Other Relevant Orders    Follow Up In Advanced [facility] - Pharmacy   PATIENT EDUCATION/DISCUSSION:  - Counseled patient on MOA, expectations, side effects, duration of therapy, contraindications, administration, and monitoring parameters  - Answered all patient questions and concerns    Follow up: 5 weeks    Ethan Duenas, PharmD    Continue all meds  under the continuation of care with the referring provider and clinical pharmacy team.    Time Spent  Time spent directly with patient, family or caregiver: 30 minutes

## 2023-10-13 ENCOUNTER — APPOINTMENT (OUTPATIENT)
Dept: PHARMACY | Facility: HOSPITAL | Age: 53
End: 2023-10-13
Payer: COMMERCIAL

## 2023-11-07 ENCOUNTER — APPOINTMENT (OUTPATIENT)
Dept: UROLOGY | Facility: CLINIC | Age: 53
End: 2023-11-07
Payer: COMMERCIAL

## 2023-11-10 ENCOUNTER — PHARMACY VISIT (OUTPATIENT)
Dept: PHARMACY | Facility: CLINIC | Age: 53
End: 2023-11-10
Payer: MEDICARE

## 2023-11-10 ENCOUNTER — TELEMEDICINE (OUTPATIENT)
Dept: PHARMACY | Facility: HOSPITAL | Age: 53
End: 2023-11-10
Payer: COMMERCIAL

## 2023-11-10 ENCOUNTER — OFFICE VISIT (OUTPATIENT)
Dept: UROLOGY | Facility: CLINIC | Age: 53
End: 2023-11-10
Payer: COMMERCIAL

## 2023-11-10 VITALS
HEART RATE: 77 BPM | WEIGHT: 179 LBS | SYSTOLIC BLOOD PRESSURE: 124 MMHG | DIASTOLIC BLOOD PRESSURE: 84 MMHG | BODY MASS INDEX: 28.91 KG/M2

## 2023-11-10 DIAGNOSIS — E11.9 TYPE 2 DIABETES MELLITUS WITHOUT COMPLICATION, WITHOUT LONG-TERM CURRENT USE OF INSULIN (MULTI): Primary | ICD-10-CM

## 2023-11-10 DIAGNOSIS — M62.89 HIGH-TONE PELVIC FLOOR DYSFUNCTION: ICD-10-CM

## 2023-11-10 DIAGNOSIS — R35.0 URINARY FREQUENCY: Primary | ICD-10-CM

## 2023-11-10 DIAGNOSIS — R39.15 URGENCY OF URINATION: ICD-10-CM

## 2023-11-10 LAB
POC APPEARANCE, URINE: CLEAR
POC BILIRUBIN, URINE: NEGATIVE
POC BLOOD, URINE: NEGATIVE
POC COLOR, URINE: YELLOW
POC GLUCOSE, URINE: NEGATIVE MG/DL
POC KETONES, URINE: NEGATIVE MG/DL
POC LEUKOCYTES, URINE: NEGATIVE
POC NITRITE,URINE: NEGATIVE
POC PH, URINE: 5.5 PH
POC PROTEIN, URINE: NEGATIVE MG/DL
POC SPECIFIC GRAVITY, URINE: 1.01
POC UROBILINOGEN, URINE: 0.2 EU/DL

## 2023-11-10 PROCEDURE — 3074F SYST BP LT 130 MM HG: CPT | Performed by: OBSTETRICS & GYNECOLOGY

## 2023-11-10 PROCEDURE — 99204 OFFICE O/P NEW MOD 45 MIN: CPT | Performed by: OBSTETRICS & GYNECOLOGY

## 2023-11-10 PROCEDURE — 1036F TOBACCO NON-USER: CPT | Performed by: OBSTETRICS & GYNECOLOGY

## 2023-11-10 PROCEDURE — 3051F HG A1C>EQUAL 7.0%<8.0%: CPT | Performed by: OBSTETRICS & GYNECOLOGY

## 2023-11-10 PROCEDURE — RXMED WILLOW AMBULATORY MEDICATION CHARGE

## 2023-11-10 PROCEDURE — 99214 OFFICE O/P EST MOD 30 MIN: CPT | Performed by: OBSTETRICS & GYNECOLOGY

## 2023-11-10 PROCEDURE — 81003 URINALYSIS AUTO W/O SCOPE: CPT | Performed by: OBSTETRICS & GYNECOLOGY

## 2023-11-10 PROCEDURE — 3079F DIAST BP 80-89 MM HG: CPT | Performed by: OBSTETRICS & GYNECOLOGY

## 2023-11-10 NOTE — ASSESSMENT & PLAN NOTE
No home BG readings for assessment but last A1c was 6.9% and patient continues to make healthy diet/lifestyle changes. Continues to do well on Trulicity 3 mg weekly with significant increase in satiety. Eating about 50% of usual portion sizes. Counting carbs. No AE or complaints.    Plan:  Continue monotherapy with Trulicity 3 mg once weekly.    PATIENT EDUCATION/DISCUSSION:  - Counseled patient on MOA, expectations, side effects, duration of therapy, contraindications, administration, and monitoring parameters  - Answered all patient questions and concerns

## 2023-11-10 NOTE — PROGRESS NOTES
Subjective   Patient ID: Lashone D Lemon is a 52 y.o. female who presents for Diabetes.    Referring Provider: Dian Canales DO     She presents for her follow-up diabetic visit. She has type 2 diabetes mellitus. Her disease course has been improving. There are no hypoglycemic associated symptoms. There are no diabetic associated symptoms. There are no hypoglycemic complications. There are no diabetic complications. Risk factors for coronary artery disease include diabetes mellitus, dyslipidemia, sedentary lifestyle and stress. Current diabetic treatments: GLP-1 RA. She is compliant with treatment all of the time. Her weight is stable. She is following a generally healthy diet. When asked about meal planning, she reported none. She has had a previous visit with a dietitian. Exercise: Not currently exercising. Her home blood glucose trend is unable to be assessed (Not testing at home due to anxiety) She sees a podiatrist.        Review of Systems    Medication System Management:  Affordability/Accessibility: no issues reported  Adherence/Organization: no issues reported  Adverse Effects: no issues reported    Objective     There were no vitals taken for this visit.     Labs  Lab Results   Component Value Date    BILITOT 0.4 06/27/2023    CALCIUM 10.4 06/27/2023    CO2 25 06/27/2023     06/27/2023    CREATININE 0.69 06/27/2023    GLUCOSE 132 (H) 06/27/2023    ALKPHOS 81 06/27/2023    K 4.3 06/27/2023    PROT 8.7 (H) 06/27/2023     06/27/2023    AST 31 06/27/2023    ALT 39 06/27/2023    BUN 14 06/27/2023    ANIONGAP 17 06/27/2023    MG 2.08 06/27/2023    PHOS 3.7 05/19/2018    ALBUMIN 5.3 (H) 06/27/2023    GFRF >90 06/27/2023     Lab Results   Component Value Date    TRIG 43 12/15/2022    CHOL 149 12/15/2022    HDL 67.2 12/15/2022     Lab Results   Component Value Date    HGBA1C 7.5 (A) 06/27/2023       Current Outpatient Medications on File Prior to Visit   Medication Sig Dispense Refill    aMILoride  (Midamor) 5 mg tablet one tablet daily      atorvastatin (Lipitor) 20 mg tablet Take 1 tablet (20 mg) by mouth once daily. 90 tablet 3    azelaic acid (Finacea) 15 % gel 1 application      calcium carbonate-vitamin D3 600 mg-5 mcg (200 unit) tablet Take 1 tablet by mouth once daily.      calcium citrate/vitamin D3 (CITRACAL PLUS D ORAL) Take 1 tablet by mouth daily Indications: vitamin deficiency.      cholecalciferol (Vitamin D-3) 25 MCG (1000 UT) capsule Take 3 tablets by mouth once daily.      clindamycin (Cleocin T) 1 % external solution 1 application      clindamycin-benzoyl peroxide (Acanya) gel 1 Application      clindamycin-benzoyl peroxide (Onexton) 1.2 %(1 % base) -3.75 % gel Onexton 1.2 % (1 % base)-3.75 % topical gel with pump      clindamycin-tretinoin (Ziana) gel APPLY TOPICALLY TO FACE EVERY NIGHT AT BEDTIME      cyanocobalamin (Vitamin B-12) 1,000 mcg tablet Take 100 mcg by mouth once daily.      dicyclomine (Bentyl) 10 mg capsule Take 1 capsule (10 mg) by mouth 3 times a day. 90 capsule 1    dulaglutide (Trulicity) 3 mg/0.5 mL pen injector Inject 3 mg under the skin 1 (one) time per week. 2 mL 2    Echinacea purpurea aerial ext (Echinacea purp aerial part ext) 65 mg capsule Take 1 capsule by mouth daily Indications: vitamin deficiency.      elderberry fruit and flower 460-115 mg capsule Take by mouth as directed  Indications: vitamin deficiency      ergocalciferol (Vitamin D-2) 1.25 MG (87696 UT) capsule Take as directed      fexofenadine (Allegra) 60 mg tablet Take 1 tablet (60 mg) by mouth once daily.      fexofenadine/pseudoephedrine (ALLEGRA-D 24 HOUR ORAL) Take by mouth as directed  Indications: allergies      fluconazole (Diflucan) 150 mg tablet Take 1 tablet (150 mg) by mouth once a week.      fluticasone (Flonase) 50 mcg/actuation nasal spray Administer 1 spray into each nostril once daily. Shake gently. Before first use, prime pump. After use, clean tip and replace cap.      folic acid  (Folvite) 1 mg tablet Take by mouth.      ibuprofen 200 mg tablet Take 800 mg by mouth every 6 hours as needed (for menstral cramps).      multivitamin (Multiple Vitamins) tablet Take 1 tablet by mouth once daily.      NON FORMULARY V-R Vitamin B-12 TABS      omeprazole (PriLOSEC) 10 mg packet for oral suspension Take by mouth.      Onexton 1.2 %(1 % base) -3.75 % gel with pump       spironolactone (Aldactone) 50 mg tablet TAKE 1 TABLET BY MOUTH DAILY 90 tablet 0    tacrolimus (Protopic) 0.1 % ointment APPLY TOPICALLY AA OF NECK BID PRN      tretinoin (Atralin) 0.05 % gel 1 Application      triamcinolone (Kenalog) 0.1 % ointment       triamcinolone acetonide 0.05 % ointment Apply  to affected area.      [DISCONTINUED] Aldactone 50 mg tablet Take 1 tablet (50 mg) by mouth once daily.       No current facility-administered medications on file prior to visit.        Assessment/Plan   Problem List Items Addressed This Visit             ICD-10-CM    Diabetes mellitus (CMS/[facility]) - Primary E11.9     No home BG readings for assessment but last A1c was 6.9% and patient continues to make healthy diet/lifestyle changes. Continues to do well on Trulicity 3 mg weekly with significant increase in satiety. Eating about 50% of usual portion sizes. Counting carbs. No AE or complaints.    Plan:  Continue monotherapy with Trulicity 3 mg once weekly.    PATIENT EDUCATION/DISCUSSION:  - Counseled patient on MOA, expectations, side effects, duration of therapy, contraindications, administration, and monitoring parameters  - Answered all patient questions and concerns         Relevant Orders    Follow Up In [facility] - Pharmacy   Follow up: 4 weeks      Ethan Duenas, PharmD    Continue all meds under the continuation of care with the referring provider and clinical pharmacy team.

## 2023-11-10 NOTE — PATIENT INSTRUCTIONS
You have been provided a referral for pelvic floor physical therapy and a list of pelvic. Please follow-up at your earliest convenience.    Please follow-up in 8 weeks to discuss her lower urinary tract symptoms.    [phone].

## 2023-11-10 NOTE — PROGRESS NOTES
Subjective   Patient ID: Lashone D Lemon is a 52 y.o. female who presents for urinary urgency, frequency and incontinence  HPI    52- year-old patient presenting as a referral from Dr. Dian Canales  with complaints of urinary urgency, frequency and incontinence    The patient presents with complaints  of urinary urgency and frequency complaints since 2018. She notes episodes of nocturia but denies any enuresis. She voids every 1-2 hours during the day with episodes of urgency when she is not able to hold it. She has tried Myrbetriq without any relief. She denies any leaking with laughing, coughing and sneezing. She denies any History of nephrolithiasis, gross hematuria or chronic recurrent UTIs.    She is a known case of diabetes with the most recent HbA1c of with a most recent A1c of 6.8.  She is presently on Trulicity and is working very hard to improve her blood sugar control.     She is not sexually active and denies any vaginal complaints, no abnormal vaginal bleeding or discharge. She denies any vaginal dryness or irritation. She denies any bulge complaints, no pressure or pulling.    She denies any bowel related complaints, no fecal or flatal incontinence.    She has no other complaints.      Review of Systems  Constitutional: No fever, No chills and No fatigue.   Eyes: No vision problems and No dryness of the eyes.   ENT: No dry mouth, No hearing loss and No nosebleeds.   Cardiovascular: No chest pain, No palpitations and No orthopnea.   Respiratory: No shortness of breath, No cough and No wheezing.   Gastrointestinal: No abdominal pain, No constipation, No nausea, No diarrhea, No vomiting and No melena.   Genitourinary: As noted in HPI.   Musculoskeletal: No back pain, No myalgias, No muscle weakness, No joint swelling and No leg edema.   Integumentary: No rashes, No skin lesion and No itching.   Neurological: No headache, No numbness and No dizziness.   Psychiatric: No sleep disturbances, No anxiety and No  depression.   Endocrine: No hot flashes, No loss of hair and No hirsutism.   Hematologic/Lymphatic: No swollen glands, No tendency for easy bleeding and No tendency for easy bruising.   All other systems have been reviewed and are negative for complaint.        Objective   Physical Exam      PHYSICAL EXAMINATION:  No LMP recorded.  Body mass index is 28.91 kg/m².  /84 (BP Location: Right arm)   Pulse 77   Wt 81.2 kg (179 lb)   BMI 28.91 kg/m²   General Appearance: well appearing  Neuro: Alert and oriented   HEENT: mucous membranes moist, neck supple  Resp: No respiratory distress, normal work of breathing  MSK: normal range of motion, gait appropriate    Pelvic:  Genitourinary:  normal external genitalia, Bartholin's glands negative, [place]'s glands negative  Urethra   normal meatus, non-tender, no periurethral mass  Vaginal mucosa  normal  Cervix  normal  Uterus  normal size, non-tender, mobile  Adnexae  negative nontender, no masses  Atrophy negative    CST negative  Pelvic floor muscle contraction  2/5, very high tone.  No pain    POP-Q (in supine position):  Stage 0    Rectal: no hemorrhoids, fissures or masses      Assessment/Plan   52-year-old with urinary urgency and frequency, high tone pelvic floor dysfunction, and mild pelvic floor weakness.    #1 we discussed the patient's lower urinary tract symptoms.  We discussed the AUA OAB care pathway.  The patient is previously utilized Myrbetriq with no improvement in her symptoms.  On exam today she was noted to have a significantly high tone pelvic floor and we discussed how this relates to her lower urinary tract complaints.  We discussed the benefits of pelvic floor physical therapy and the patient was provided a referral as well as a list of providers.  We also discussed the possibility of anticholinergic therapy as well as third line therapeutic options.    2.  The patient will follow-up in 6 to 8 weeks to discuss her lower urinary tract  maxwell.    FLORENTINO Rothman MD    Scribe Attestation  By signing my name below, I, Leeanne Nigel Bennett attest that this documentation has been prepared under the direction and in the presence of Ad Rothman MD. All medical record entries made by the Scribe were at my direction or personally dictated by me. I have reviewed the chart and agree that the record accurately reflects my personal performance of the history, physical exam, discussion and plan.

## 2023-11-18 DIAGNOSIS — L20.82 FLEXURAL ECZEMA: Primary | ICD-10-CM

## 2023-11-20 RX ORDER — TACROLIMUS 1 MG/G
OINTMENT TOPICAL
Qty: 60 G | Refills: 3 | Status: SHIPPED | OUTPATIENT
Start: 2023-11-20 | End: 2023-12-19 | Stop reason: WASHOUT

## 2023-12-07 ENCOUNTER — TELEMEDICINE (OUTPATIENT)
Dept: PHARMACY | Facility: HOSPITAL | Age: 53
End: 2023-12-07
Payer: COMMERCIAL

## 2023-12-07 DIAGNOSIS — E11.9 TYPE 2 DIABETES MELLITUS WITHOUT COMPLICATION, WITHOUT LONG-TERM CURRENT USE OF INSULIN (MULTI): Primary | ICD-10-CM

## 2023-12-07 ASSESSMENT — ENCOUNTER SYMPTOMS: DIABETIC ASSOCIATED SYMPTOMS: 0

## 2023-12-07 NOTE — PROGRESS NOTES
Subjective   Patient ID: Lashone D Lemon is a 52 y.o. female who presents for Diabetes.    Referring Provider: Dian Canales DO     Diabetes  She presents for her follow-up diabetic visit. She has type 2 diabetes mellitus. Her disease course has been improving. There are no hypoglycemic associated symptoms. There are no diabetic associated symptoms. There are no hypoglycemic complications. There are no diabetic complications. Risk factors for coronary artery disease include diabetes mellitus and dyslipidemia. Current diabetic treatments: GLP1 RA. She is compliant with treatment all of the time. Her weight is stable. She is following a generally healthy diet. Meal planning includes avoidance of concentrated sweets, carbohydrate counting and calorie counting. She has had a previous visit with a dietitian. Home blood sugar record trend: Unable to assess. (Not testing at home) An ACE inhibitor/angiotensin II receptor blocker is not being taken.       Review of Systems    Medication System Management:  Affordability/Accessibility: no issues  Adherence/Organization: no issues  Adverse Effects: none    Objective     There were no vitals taken for this visit.     Labs  Lab Results   Component Value Date    BILITOT 0.4 06/27/2023    CALCIUM 10.4 06/27/2023    CO2 25 06/27/2023     06/27/2023    CREATININE 0.69 06/27/2023    GLUCOSE 132 (H) 06/27/2023    ALKPHOS 81 06/27/2023    K 4.3 06/27/2023    PROT 8.7 (H) 06/27/2023     06/27/2023    AST 31 06/27/2023    ALT 39 06/27/2023    BUN 14 06/27/2023    ANIONGAP 17 06/27/2023    MG 2.08 06/27/2023    PHOS 3.7 05/19/2018    ALBUMIN 5.3 (H) 06/27/2023    GFRF >90 06/27/2023     Lab Results   Component Value Date    TRIG 43 12/15/2022    CHOL 149 12/15/2022    HDL 67.2 12/15/2022     Lab Results   Component Value Date    HGBA1C 7.5 (A) 06/27/2023       Current Outpatient Medications on File Prior to Visit   Medication Sig Dispense Refill    aMILoride (Midamor) 5 mg  tablet one tablet daily      atorvastatin (Lipitor) 20 mg tablet Take 1 tablet (20 mg) by mouth once daily. 90 tablet 3    azelaic acid (Finacea) 15 % gel 1 application      calcium carbonate-vitamin D3 600 mg-5 mcg (200 unit) tablet Take 1 tablet by mouth once daily.      calcium citrate/vitamin D3 (CITRACAL PLUS D ORAL) Take 1 tablet by mouth daily Indications: vitamin deficiency.      cholecalciferol (Vitamin D-3) 25 MCG (1000 UT) capsule Take 3 tablets by mouth once daily.      clindamycin (Cleocin T) 1 % external solution 1 application      clindamycin-benzoyl peroxide (Acanya) gel 1 Application      clindamycin-benzoyl peroxide (Onexton) 1.2 %(1 % base) -3.75 % gel Onexton 1.2 % (1 % base)-3.75 % topical gel with pump      clindamycin-tretinoin (Ziana) gel APPLY TOPICALLY TO FACE EVERY NIGHT AT BEDTIME      cyanocobalamin (Vitamin B-12) 1,000 mcg tablet Take 100 mcg by mouth once daily.      dicyclomine (Bentyl) 10 mg capsule Take 1 capsule (10 mg) by mouth 3 times a day. 90 capsule 1    dulaglutide (Trulicity) 3 mg/0.5 mL pen injector Inject 3 mg under the skin 1 (one) time per week. 2 mL 2    Echinacea purpurea aerial ext (Echinacea purp aerial part ext) 65 mg capsule Take 1 capsule by mouth daily Indications: vitamin deficiency.      elderberry fruit and flower 460-115 mg capsule Take by mouth as directed  Indications: vitamin deficiency      ergocalciferol (Vitamin D-2) 1.25 MG (83209 UT) capsule Take as directed      fexofenadine (Allegra) 60 mg tablet Take 1 tablet (60 mg) by mouth once daily.      fexofenadine/pseudoephedrine (ALLEGRA-D 24 HOUR ORAL) Take by mouth as directed  Indications: allergies      fluconazole (Diflucan) 150 mg tablet Take 1 tablet (150 mg) by mouth once a week.      fluticasone (Flonase) 50 mcg/actuation nasal spray Administer 1 spray into each nostril once daily. Shake gently. Before first use, prime pump. After use, clean tip and replace cap.      folic acid (Folvite) 1 mg  tablet Take by mouth.      ibuprofen 200 mg tablet Take 800 mg by mouth every 6 hours as needed (for menstral cramps).      multivitamin (Multiple Vitamins) tablet Take 1 tablet by mouth once daily.      NON FORMULARY V-R Vitamin B-12 TABS      omeprazole (PriLOSEC) 10 mg packet for oral suspension Take by mouth.      Onexton 1.2 %(1 % base) -3.75 % gel with pump       spironolactone (Aldactone) 50 mg tablet TAKE 1 TABLET BY MOUTH DAILY 90 tablet 0    tacrolimus (Protopic) 0.1 % ointment APPLY TOPICALLY TO THE AFFECTED AREA OF NECK TWICE DAILY. AVOID EYES UP TO 2 WEEKS, TAPER 2 TIMES PER DAY WEEKENDS ONLY. REPEAT EVERY 6-8 WEEKS 60 g 3    tretinoin (Atralin) 0.05 % gel 1 Application      triamcinolone (Kenalog) 0.1 % ointment       triamcinolone acetonide 0.05 % ointment Apply  to affected area.       No current facility-administered medications on file prior to visit.        Assessment/Plan   Problem List Items Addressed This Visit             ICD-10-CM    Diabetes mellitus (CMS/[facility]) - Primary E11.9     No home BG readings for assessment but last A1c was 6.9% and patient continues to make healthy diet/lifestyle changes. Continues to do well on Trulicity 3 mg weekly with significant increase in satiety. Eating about 50% of usual portion sizes. Counting carbs. No AE or complaints.    Plan:  Continue monotherapy with Trulicity 3 mg once weekly         Relevant Orders    Follow Up In Advanced [facility] - Pharmacy   Follow up: 4 weeks    Ethan Duenas, PharmD    Continue all meds under the continuation of care with the referring provider and clinical pharmacy team.

## 2023-12-07 NOTE — ASSESSMENT & PLAN NOTE
No home BG readings for assessment but last A1c was 6.9% and patient continues to make healthy diet/lifestyle changes. Continues to do well on Trulicity 3 mg weekly with significant increase in satiety. Eating about 50% of usual portion sizes. Counting carbs. No AE or complaints.    Plan:  Continue monotherapy with Trulicity 3 mg once weekly

## 2023-12-08 ENCOUNTER — APPOINTMENT (OUTPATIENT)
Dept: PHARMACY | Facility: HOSPITAL | Age: 53
End: 2023-12-08
Payer: COMMERCIAL

## 2023-12-09 ENCOUNTER — PHARMACY VISIT (OUTPATIENT)
Dept: PHARMACY | Facility: CLINIC | Age: 53
End: 2023-12-09
Payer: MEDICARE

## 2023-12-09 PROCEDURE — RXMED WILLOW AMBULATORY MEDICATION CHARGE

## 2023-12-09 PROCEDURE — RXOTC WILLOW AMBULATORY OTC CHARGE

## 2023-12-16 ENCOUNTER — LAB (OUTPATIENT)
Dept: LAB | Facility: LAB | Age: 53
End: 2023-12-16
Payer: COMMERCIAL

## 2023-12-16 DIAGNOSIS — E11.9 TYPE 2 DIABETES MELLITUS WITHOUT COMPLICATION, WITHOUT LONG-TERM CURRENT USE OF INSULIN (MULTI): ICD-10-CM

## 2023-12-16 DIAGNOSIS — Z01.84 IMMUNITY STATUS TESTING: ICD-10-CM

## 2023-12-16 DIAGNOSIS — Z00.00 ENCOUNTER FOR PREVENTATIVE ADULT HEALTH CARE EXAMINATION: ICD-10-CM

## 2023-12-16 LAB
ALBUMIN SERPL BCP-MCNC: 4.6 G/DL (ref 3.4–5)
ALP SERPL-CCNC: 72 U/L (ref 33–110)
ALT SERPL W P-5'-P-CCNC: 14 U/L (ref 7–45)
ANION GAP SERPL CALC-SCNC: 14 MMOL/L (ref 10–20)
AST SERPL W P-5'-P-CCNC: 14 U/L (ref 9–39)
BILIRUB SERPL-MCNC: 0.6 MG/DL (ref 0–1.2)
BUN SERPL-MCNC: 16 MG/DL (ref 6–23)
CALCIUM SERPL-MCNC: 9.8 MG/DL (ref 8.6–10.6)
CHLORIDE SERPL-SCNC: 105 MMOL/L (ref 98–107)
CHOLEST SERPL-MCNC: 149 MG/DL (ref 0–199)
CHOLESTEROL/HDL RATIO: 2.5
CO2 SERPL-SCNC: 25 MMOL/L (ref 21–32)
CREAT SERPL-MCNC: 0.95 MG/DL (ref 0.5–1.05)
EST. AVERAGE GLUCOSE BLD GHB EST-MCNC: 131 MG/DL
GFR SERPL CREATININE-BSD FRML MDRD: 72 ML/MIN/1.73M*2
GLUCOSE SERPL-MCNC: 97 MG/DL (ref 74–99)
HBA1C MFR BLD: 6.2 %
HBV SURFACE AB SER-ACNC: 375.3 MIU/ML
HBV SURFACE AG SERPL QL IA: NONREACTIVE
HDLC SERPL-MCNC: 58.7 MG/DL
LDLC SERPL CALC-MCNC: 80 MG/DL
NON HDL CHOLESTEROL: 90 MG/DL (ref 0–149)
POTASSIUM SERPL-SCNC: 4 MMOL/L (ref 3.5–5.3)
PROT SERPL-MCNC: 7.7 G/DL (ref 6.4–8.2)
SODIUM SERPL-SCNC: 140 MMOL/L (ref 136–145)
TRIGL SERPL-MCNC: 50 MG/DL (ref 0–149)
VLDL: 10 MG/DL (ref 0–40)

## 2023-12-16 PROCEDURE — 36415 COLL VENOUS BLD VENIPUNCTURE: CPT

## 2023-12-16 PROCEDURE — 83036 HEMOGLOBIN GLYCOSYLATED A1C: CPT

## 2023-12-16 PROCEDURE — 80061 LIPID PANEL: CPT

## 2023-12-16 PROCEDURE — 87340 HEPATITIS B SURFACE AG IA: CPT

## 2023-12-16 PROCEDURE — 86706 HEP B SURFACE ANTIBODY: CPT

## 2023-12-16 PROCEDURE — 80053 COMPREHEN METABOLIC PANEL: CPT

## 2023-12-19 ENCOUNTER — OFFICE VISIT (OUTPATIENT)
Dept: PRIMARY CARE | Facility: CLINIC | Age: 53
End: 2023-12-19
Payer: COMMERCIAL

## 2023-12-19 VITALS
BODY MASS INDEX: 28.09 KG/M2 | WEIGHT: 174.8 LBS | OXYGEN SATURATION: 96 % | HEART RATE: 68 BPM | SYSTOLIC BLOOD PRESSURE: 120 MMHG | DIASTOLIC BLOOD PRESSURE: 80 MMHG | HEIGHT: 66 IN

## 2023-12-19 DIAGNOSIS — K58.9 IRRITABLE BOWEL SYNDROME, UNSPECIFIED TYPE: ICD-10-CM

## 2023-12-19 DIAGNOSIS — G89.29 CHRONIC LOW BACK PAIN, UNSPECIFIED BACK PAIN LATERALITY, UNSPECIFIED WHETHER SCIATICA PRESENT: ICD-10-CM

## 2023-12-19 DIAGNOSIS — M54.50 CHRONIC LOW BACK PAIN, UNSPECIFIED BACK PAIN LATERALITY, UNSPECIFIED WHETHER SCIATICA PRESENT: ICD-10-CM

## 2023-12-19 DIAGNOSIS — E11.9 TYPE 2 DIABETES MELLITUS WITHOUT COMPLICATION, WITHOUT LONG-TERM CURRENT USE OF INSULIN (MULTI): Primary | ICD-10-CM

## 2023-12-19 DIAGNOSIS — N32.81 OVERACTIVE BLADDER: ICD-10-CM

## 2023-12-19 DIAGNOSIS — K21.9 GERD WITHOUT ESOPHAGITIS: ICD-10-CM

## 2023-12-19 DIAGNOSIS — E78.49 OTHER HYPERLIPIDEMIA: ICD-10-CM

## 2023-12-19 DIAGNOSIS — M79.7 FIBROMYALGIA: ICD-10-CM

## 2023-12-19 PROBLEM — L72.0 EPIDERMAL CYST: Status: RESOLVED | Noted: 2023-06-06 | Resolved: 2023-12-19

## 2023-12-19 PROBLEM — L20.82 FLEXURAL ECZEMA: Status: RESOLVED | Noted: 2023-06-06 | Resolved: 2023-12-19

## 2023-12-19 PROBLEM — L82.1 OTHER SEBORRHEIC KERATOSIS: Status: RESOLVED | Noted: 2023-06-06 | Resolved: 2023-12-19

## 2023-12-19 PROBLEM — D48.5 NEOPLASM OF UNCERTAIN BEHAVIOR OF SKIN: Status: RESOLVED | Noted: 2023-06-06 | Resolved: 2023-12-19

## 2023-12-19 PROCEDURE — 3074F SYST BP LT 130 MM HG: CPT | Performed by: INTERNAL MEDICINE

## 2023-12-19 PROCEDURE — 3079F DIAST BP 80-89 MM HG: CPT | Performed by: INTERNAL MEDICINE

## 2023-12-19 PROCEDURE — 3048F LDL-C <100 MG/DL: CPT | Performed by: INTERNAL MEDICINE

## 2023-12-19 PROCEDURE — 3044F HG A1C LEVEL LT 7.0%: CPT | Performed by: INTERNAL MEDICINE

## 2023-12-19 PROCEDURE — 99396 PREV VISIT EST AGE 40-64: CPT | Performed by: INTERNAL MEDICINE

## 2023-12-19 PROCEDURE — 1036F TOBACCO NON-USER: CPT | Performed by: INTERNAL MEDICINE

## 2023-12-19 RX ORDER — DICYCLOMINE HYDROCHLORIDE 10 MG/1
10 CAPSULE ORAL 3 TIMES DAILY
Qty: 90 CAPSULE | Refills: 1 | Status: SHIPPED | OUTPATIENT
Start: 2023-12-19

## 2023-12-19 RX ORDER — ATORVASTATIN CALCIUM 20 MG/1
20 TABLET, FILM COATED ORAL DAILY
Qty: 90 TABLET | Refills: 3 | Status: SHIPPED | OUTPATIENT
Start: 2023-12-19

## 2023-12-19 ASSESSMENT — PATIENT HEALTH QUESTIONNAIRE - PHQ9
2. FEELING DOWN, DEPRESSED OR HOPELESS: NOT AT ALL
SUM OF ALL RESPONSES TO PHQ9 QUESTIONS 1 & 2: 0
1. LITTLE INTEREST OR PLEASURE IN DOING THINGS: NOT AT ALL
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
## 2023-12-19 NOTE — ASSESSMENT & PLAN NOTE
Followed by Dr. Baires at [facility] has had repeat MRI performed and due for followup. Followup scheduled in February.   Also follows with PT at [facility] in [place] has adequate followup.

## 2023-12-19 NOTE — PATIENT INSTRUCTIONS
It was a pleasure to see you today! Here is a list of things we have discussed and to follow up on:    You are doing AMAZINGLY! HAPPY BIRTHDAY!   Schedule pelvic floor physical therapy as recommended by Dr. Rothman.   Followup 3 months

## 2023-12-19 NOTE — ASSESSMENT & PLAN NOTE
Seen by Dr. Rothman, no response to myrbetiq in the past recommend pelvic floor PT. Not currently interested in medical management.

## 2023-12-19 NOTE — ASSESSMENT & PLAN NOTE
Has been following with pharmacy team Ethan now on Trulicity with uptritrated dose to 3mg.    Last A1C 7.5% repeat today 6.9%!   On: Trulicity 3mg   Albuminuria: 6/23 none    Optho needs to reschedule with Dr. Behr   Podiatry: rescheduled with Dr. Isaias Noguera   Statin on atorvastatin   Immunizations: UTD

## 2023-12-20 RX ORDER — DICYCLOMINE HYDROCHLORIDE 10 MG/1
CAPSULE ORAL
Qty: 270 CAPSULE | OUTPATIENT
Start: 2023-12-20

## 2024-01-03 ENCOUNTER — TELEPHONE (OUTPATIENT)
Dept: PHARMACY | Facility: HOSPITAL | Age: 54
End: 2024-01-03
Payer: COMMERCIAL

## 2024-01-03 DIAGNOSIS — E11.9 TYPE 2 DIABETES MELLITUS WITHOUT COMPLICATION, WITHOUT LONG-TERM CURRENT USE OF INSULIN (MULTI): ICD-10-CM

## 2024-01-03 PROCEDURE — RXMED WILLOW AMBULATORY MEDICATION CHARGE

## 2024-01-03 RX ORDER — DULAGLUTIDE 3 MG/.5ML
3 INJECTION, SOLUTION SUBCUTANEOUS
Qty: 2 ML | Refills: 2 | Status: SHIPPED | OUTPATIENT
Start: 2024-01-03 | End: 2024-03-29 | Stop reason: SDUPTHER

## 2024-01-03 NOTE — TELEPHONE ENCOUNTER
Patient called to reschedule upcoming clinical pharmacy appointment 1/5/24. Rescheduled to 2/2/24 and provided refills for Trulicity 3 mg.      01/03/24 at 4:36 PM - Ethan Duenas PharmD

## 2024-01-04 ENCOUNTER — PHARMACY VISIT (OUTPATIENT)
Dept: PHARMACY | Facility: CLINIC | Age: 54
End: 2024-01-04
Payer: MEDICARE

## 2024-01-05 ENCOUNTER — APPOINTMENT (OUTPATIENT)
Dept: PHARMACY | Facility: HOSPITAL | Age: 54
End: 2024-01-05
Payer: COMMERCIAL

## 2024-02-02 ENCOUNTER — PHARMACY VISIT (OUTPATIENT)
Dept: PHARMACY | Facility: CLINIC | Age: 54
End: 2024-02-02
Payer: MEDICARE

## 2024-02-02 ENCOUNTER — TELEMEDICINE (OUTPATIENT)
Dept: PHARMACY | Facility: HOSPITAL | Age: 54
End: 2024-02-02
Payer: COMMERCIAL

## 2024-02-02 DIAGNOSIS — E11.9 TYPE 2 DIABETES MELLITUS WITHOUT COMPLICATION, WITHOUT LONG-TERM CURRENT USE OF INSULIN (MULTI): Primary | ICD-10-CM

## 2024-02-02 PROCEDURE — RXMED WILLOW AMBULATORY MEDICATION CHARGE

## 2024-02-02 ASSESSMENT — ENCOUNTER SYMPTOMS: DIABETIC ASSOCIATED SYMPTOMS: 0

## 2024-02-02 NOTE — ASSESSMENT & PLAN NOTE
Last A1c 6.2% 12/16/23. Goal <6.5%.  On monotherapy with Trulicity 3 mg weekly. No AE or complaints.  Discussed diabetes pathophysiology and management today.  Doing excellent with managing diet and exercise!  Missed eye exam. Plans to reschedule.    Plan:  Continue monotherapy with Trulicity 3 mg once weekly.

## 2024-02-02 NOTE — PROGRESS NOTES
Subjective   Patient ID: Lashone D Lemon is a 53 y.o. female who presents for Diabetes.    Referring Provider: Dian Canales DO     Diabetes  She presents for her follow-up diabetic visit. She has type 2 diabetes mellitus. Her disease course has been improving. There are no hypoglycemic associated symptoms. There are no diabetic associated symptoms. There are no hypoglycemic complications. There are no diabetic complications. Risk factors for coronary artery disease include diabetes mellitus and dyslipidemia. Current diabetic treatments: GLP-1 monotherapy. She is compliant with treatment all of the time. She is following a generally healthy diet. Meal planning includes calorie counting, carbohydrate counting and avoidance of concentrated sweets. She has had a previous visit with a dietitian. She participates in exercise three times a week (Treadmill mill in basement). Eye exam is not current.       Review of Systems    Medication System Management:  Affordability/Accessibility: none  Adherence/Organization: none  Adverse Effects: none    Objective     There were no vitals taken for this visit.     Labs  Lab Results   Component Value Date    BILITOT 0.6 12/16/2023    CALCIUM 9.8 12/16/2023    CO2 25 12/16/2023     12/16/2023    CREATININE 0.95 12/16/2023    GLUCOSE 97 12/16/2023    ALKPHOS 72 12/16/2023    K 4.0 12/16/2023    PROT 7.7 12/16/2023     12/16/2023    AST 14 12/16/2023    ALT 14 12/16/2023    BUN 16 12/16/2023    ANIONGAP 14 12/16/2023    MG 2.08 06/27/2023    PHOS 3.7 05/19/2018    ALBUMIN 4.6 12/16/2023    GFRF >90 06/27/2023     Lab Results   Component Value Date    TRIG 50 12/16/2023    CHOL 149 12/16/2023    LDLCALC 80 12/16/2023    HDL 58.7 12/16/2023     Lab Results   Component Value Date    HGBA1C 6.2 (H) 12/16/2023       Current Outpatient Medications on File Prior to Visit   Medication Sig Dispense Refill    atorvastatin (Lipitor) 20 mg tablet Take 1 tablet (20 mg) by mouth once  daily. 90 tablet 3    calcium carbonate-vitamin D3 600 mg-5 mcg (200 unit) tablet Take 1 tablet by mouth once daily.      cholecalciferol (Vitamin D-3) 25 MCG (1000 UT) capsule Take 3 tablets by mouth once daily.      cyanocobalamin (Vitamin B-12) 1,000 mcg tablet Take 100 mcg by mouth once daily.      dicyclomine (Bentyl) 10 mg capsule Take 1 capsule (10 mg) by mouth 3 times a day. 90 capsule 1    dulaglutide (Trulicity) 3 mg/0.5 mL pen injector Inject 3 mg under the skin 1 (one) time per week. 2 mL 2    fexofenadine (Allegra) 60 mg tablet Take 1 tablet (60 mg) by mouth once daily.      fluconazole (Diflucan) 150 mg tablet Take 1 tablet (150 mg) by mouth once a week.      folic acid (Folvite) 1 mg tablet Take by mouth.      omeprazole (PriLOSEC) 10 mg packet for oral suspension Take by mouth.      spironolactone (Aldactone) 50 mg tablet TAKE 1 TABLET BY MOUTH DAILY 90 tablet 0     No current facility-administered medications on file prior to visit.        Assessment/Plan   Problem List Items Addressed This Visit             ICD-10-CM    Diabetes mellitus (CMS/[facility]) - Primary E11.9     Last A1c 6.2% 12/16/23. Goal <6.5%.  On monotherapy with Trulicity 3 mg weekly. No AE or complaints.  Discussed diabetes pathophysiology and management today.  Doing excellent with managing diet and exercise!  Missed eye exam. Plans to reschedule.    Plan:  Continue monotherapy with Trulicity 3 mg once weekly.         Relevant Orders    Follow Up In Advanced Primary Care - Pharmacy   Follow up: 1 month    Ethan Duenas, PharmD    Continue all meds under the continuation of care with the referring provider and clinical pharmacy team.

## 2024-02-06 ASSESSMENT — EXTERNAL EXAM - LEFT EYE: OS_EXAM: NORMAL

## 2024-02-06 ASSESSMENT — EXTERNAL EXAM - RIGHT EYE: OD_EXAM: NORMAL

## 2024-02-06 ASSESSMENT — CUP TO DISC RATIO
OD_RATIO: .1
OS_RATIO: .1

## 2024-02-06 ASSESSMENT — SLIT LAMP EXAM - LIDS
COMMENTS: GOOD POSITION
COMMENTS: GOOD POSITION

## 2024-02-06 NOTE — PROGRESS NOTES
Controlled diabetes mellitus type II without tqokkarycyzpA00.9  -HbA1c= 6.2 (12/16/23). No diabetic retinopathy seen on exam. Continue close monitoring of blood glucose, blood pressure, and cholesterol. F/u 1 year for comprehensive exam and baseline OCT RNFL given FH of glaucoma.     Retinal hole or tear, itmizlszkU49.303  -OCT macula (11/30/22) - SS 10/10 OU.  Normal thickness and contour OU.  Intact IS-OS OU.  No edema OU.  254/244.  -Last saw Dr. Carlson and s/p retinopexy OU 9/23/20 - doing well.   -Exam today with good laser barrier OU with no new tears. Retinal detachment symptoms discussed.     Combined form of age-related cataract, right eyeH25.811  Combined form of age-related cataract, left eyeH25.812  -Not visually significant at this time. Monitor.     Family history of glaucoma  -(+)FH glaucoma - father, MGM, MGF  -Optic discs appear healthy (crowded/small, no obvious surface drusen), IOP WNL.   -Would plan to check baseline OCT RNFL at next visit in 1 year. Can consider HVF 24-2 as needed.     RfyfsdX55.10  DobqziuukjaW31.209  YmateusjhfX62.4  -Current glasses from 2023. Continue current glasses, declines new Rx at this time.       No history of intraocular surgery/refractive surgery.   No FH of AMD

## 2024-02-07 ENCOUNTER — OFFICE VISIT (OUTPATIENT)
Dept: OPHTHALMOLOGY | Facility: CLINIC | Age: 54
End: 2024-02-07
Payer: COMMERCIAL

## 2024-02-07 DIAGNOSIS — H52.13 MYOPIA OF BOTH EYES: ICD-10-CM

## 2024-02-07 DIAGNOSIS — H52.4 PRESBYOPIA: ICD-10-CM

## 2024-02-07 DIAGNOSIS — Z83.511 FAMILY HISTORY OF GLAUCOMA: ICD-10-CM

## 2024-02-07 DIAGNOSIS — H25.813 COMBINED FORM OF AGE-RELATED CATARACT, BOTH EYES: ICD-10-CM

## 2024-02-07 DIAGNOSIS — E11.9 DIABETES MELLITUS WITHOUT COMPLICATION (MULTI): Primary | ICD-10-CM

## 2024-02-07 DIAGNOSIS — H33.303: ICD-10-CM

## 2024-02-07 DIAGNOSIS — H52.203 ASTIGMATISM OF BOTH EYES, UNSPECIFIED TYPE: ICD-10-CM

## 2024-02-07 PROCEDURE — 92014 COMPRE OPH EXAM EST PT 1/>: CPT | Performed by: OPHTHALMOLOGY

## 2024-02-07 ASSESSMENT — ENCOUNTER SYMPTOMS
PSYCHIATRIC NEGATIVE: 0
NEUROLOGICAL NEGATIVE: 0
CONSTITUTIONAL NEGATIVE: 0
ALLERGIC/IMMUNOLOGIC NEGATIVE: 0
MUSCULOSKELETAL NEGATIVE: 0
GASTROINTESTINAL NEGATIVE: 0
EYES NEGATIVE: 1
CARDIOVASCULAR NEGATIVE: 0
RESPIRATORY NEGATIVE: 0
HEMATOLOGIC/LYMPHATIC NEGATIVE: 0
ENDOCRINE NEGATIVE: 0

## 2024-02-07 ASSESSMENT — VISUAL ACUITY
OD_CC: 20/20
CORRECTION_TYPE: GLASSES
OS_CC: 20/20
METHOD: SNELLEN - LINEAR

## 2024-02-07 ASSESSMENT — REFRACTION_MANIFEST
OD_SPHERE: -3.00
OD_ADD: +2.00
OS_CYLINDER: -1.75
OS_ADD: +2.00
OD_CYLINDER: -1.00
OS_AXIS: 015
OS_SPHERE: -2.75
OD_AXIS: 145

## 2024-02-07 ASSESSMENT — CONF VISUAL FIELD
OD_INFERIOR_TEMPORAL_RESTRICTION: 0
OD_NORMAL: 1
OD_SUPERIOR_NASAL_RESTRICTION: 0
OS_NORMAL: 1
OS_SUPERIOR_NASAL_RESTRICTION: 0
OS_SUPERIOR_TEMPORAL_RESTRICTION: 0
OD_INFERIOR_NASAL_RESTRICTION: 0
OS_INFERIOR_NASAL_RESTRICTION: 0
OD_SUPERIOR_TEMPORAL_RESTRICTION: 0
OS_INFERIOR_TEMPORAL_RESTRICTION: 0

## 2024-02-07 ASSESSMENT — REFRACTION_WEARINGRX
OD_CYLINDER: -1.25
OD_AXIS: 145
OS_CYLINDER: -1.75
OS_AXIS: 015
OD_ADD: +1.75
OS_SPHERE: -2.50
OD_SPHERE: -2.75
OS_ADD: +1.75

## 2024-02-07 ASSESSMENT — TONOMETRY
OS_IOP_MMHG: 16
OD_IOP_MMHG: 16
IOP_METHOD: GOLDMANN APPLANATION

## 2024-02-08 ENCOUNTER — OFFICE VISIT (OUTPATIENT)
Dept: UROLOGY | Facility: CLINIC | Age: 54
End: 2024-02-08
Payer: COMMERCIAL

## 2024-02-08 VITALS
BODY MASS INDEX: 28.75 KG/M2 | HEART RATE: 79 BPM | DIASTOLIC BLOOD PRESSURE: 80 MMHG | SYSTOLIC BLOOD PRESSURE: 117 MMHG | WEIGHT: 178.1 LBS

## 2024-02-08 DIAGNOSIS — M62.89 HIGH-TONE PELVIC FLOOR DYSFUNCTION: Primary | ICD-10-CM

## 2024-02-08 DIAGNOSIS — R39.15 URGENCY OF URINATION: ICD-10-CM

## 2024-02-08 DIAGNOSIS — R35.0 URINARY FREQUENCY: ICD-10-CM

## 2024-02-08 LAB
POC APPEARANCE, URINE: CLEAR
POC BILIRUBIN, URINE: NEGATIVE
POC BLOOD, URINE: NEGATIVE
POC COLOR, URINE: YELLOW
POC GLUCOSE, URINE: NEGATIVE MG/DL
POC KETONES, URINE: NEGATIVE MG/DL
POC LEUKOCYTES, URINE: ABNORMAL
POC NITRITE,URINE: NEGATIVE
POC PH, URINE: 5.5 PH
POC PROTEIN, URINE: ABNORMAL MG/DL
POC SPECIFIC GRAVITY, URINE: 1.02
POC UROBILINOGEN, URINE: 0.2 EU/DL

## 2024-02-08 PROCEDURE — 3079F DIAST BP 80-89 MM HG: CPT | Performed by: OBSTETRICS & GYNECOLOGY

## 2024-02-08 PROCEDURE — 99213 OFFICE O/P EST LOW 20 MIN: CPT | Performed by: OBSTETRICS & GYNECOLOGY

## 2024-02-08 PROCEDURE — 3074F SYST BP LT 130 MM HG: CPT | Performed by: OBSTETRICS & GYNECOLOGY

## 2024-02-08 PROCEDURE — 1036F TOBACCO NON-USER: CPT | Performed by: OBSTETRICS & GYNECOLOGY

## 2024-02-08 PROCEDURE — 81003 URINALYSIS AUTO W/O SCOPE: CPT | Performed by: OBSTETRICS & GYNECOLOGY

## 2024-02-08 NOTE — PATIENT INSTRUCTIONS
You have been provided a referral for pelvic floor physical therapy and a list of pelvic. Please follow-up at your earliest convenience.    Please follow-up in 6-8 weeks to discuss her lower urinary tract symptoms.    [phone].

## 2024-02-08 NOTE — PROGRESS NOTES
Subjective   Patient ID: Lashone D Lemon is a 53 y.o. female who presents for urinary urgency, frequency and incontinence  HPI  52-year-old with urinary urgency and frequency, high tone pelvic floor dysfunction, and mild pelvic floor weakness.    The patient has not had the opportunity to follow up with PFPT. She continues to void every 1-2 hours during the day with episodes of urgency when she is not able to hold it. She denies any UTI like symptoms.    She denies any vaginal complaints, no abnormal bleeding or discharge.     She denies any bowel related complaints, no fecal or flatal incontinence.    She has no other complaints.    From Previous note   52- year-old patient presenting as a referral from Dr. Dian Canales  with complaints of urinary urgency, frequency and incontinence    The patient presents with complaints  of urinary urgency and frequency complaints since 2018. She notes episodes of nocturia but denies any enuresis. She voids every 1-2 hours during the day with episodes of urgency when she is not able to hold it. She has tried Myrbetriq without any relief. She denies any leaking with laughing, coughing and sneezing. She denies any History of nephrolithiasis, gross hematuria or chronic recurrent UTIs.    She is a known case of diabetes with the most recent HbA1c of with a most recent A1c of 6.8.  She is presently on Trulicity and is working very hard to improve her blood sugar control.     She is not sexually active and denies any vaginal complaints, no abnormal vaginal bleeding or discharge. She denies any vaginal dryness or irritation. She denies any bulge complaints, no pressure or pulling.    She denies any bowel related complaints, no fecal or flatal incontinence.    She has no other complaints.      Review of Systems  Constitutional: No fever, No chills and No fatigue.   Eyes: No vision problems and No dryness of the eyes.   ENT: No dry mouth, No hearing loss and No nosebleeds.    Cardiovascular: No chest pain, No palpitations and No orthopnea.   Respiratory: No shortness of breath, No cough and No wheezing.   Gastrointestinal: No abdominal pain, No constipation, No nausea, No diarrhea, No vomiting and No melena.   Genitourinary: As noted in HPI.   Musculoskeletal: No back pain, No myalgias, No muscle weakness, No joint swelling and No leg edema.   Integumentary: No rashes, No skin lesion and No itching.   Neurological: No headache, No numbness and No dizziness.   Psychiatric: No sleep disturbances, No anxiety and No depression.   Endocrine: No hot flashes, No loss of hair and No hirsutism.   Hematologic/Lymphatic: No swollen glands, No tendency for easy bleeding and No tendency for easy bruising.   All other systems have been reviewed and are negative for complaint.        Objective   Physical Exam      PHYSICAL EXAMINATION:  No LMP recorded.  There is no height or weight on file to calculate BMI.  There were no vitals taken for this visit.  General Appearance: well appearing  Neuro: Alert and oriented   HEENT: mucous membranes moist, neck supple  Resp: No respiratory distress, normal work of breathing  MSK: normal range of motion, gait appropriate      Assessment/Plan   52-year-old with urinary urgency and frequency, high tone pelvic floor dysfunction, and mild pelvic floor weakness.    #1 we again discussed the patient's lower urinary tract symptoms.  We discussed the AUA O[facility] care pathway.  The patient is previously utilized Myrbetriq with no improvement in her symptoms.  She was previously noted to have a significantly high tone pelvic floor and we discussed how this relates to her lower urinary tract complaints.  We discussed the benefits of pelvic floor physical therapy and the patient was provided a new referral as well as a list of providers.  We also discussed the possibility of anticholinergic therapy, Gemtesa, as well as third line therapeutic options.    2.  The patient will  follow-up in 6 to 8 weeks to discuss her lower urinary tract symptoms.    FLORENTINO Rothman MD    Scribe Attestation  By signing my name below, I, Nigel Card attest that this documentation has been prepared under the direction and in the presence of Ad Rothman MD. All medical record entries made by the Scribe were at my direction or personally dictated by me. I have reviewed the chart and agree that the record accurately reflects my personal performance of the history, physical exam, discussion and plan.

## 2024-02-13 DIAGNOSIS — L70.0 ACNE VULGARIS: ICD-10-CM

## 2024-02-13 RX ORDER — SPIRONOLACTONE 50 MG/1
50 TABLET, FILM COATED ORAL DAILY
Qty: 90 TABLET | Refills: 0 | Status: SHIPPED | OUTPATIENT
Start: 2024-02-13 | End: 2024-03-20 | Stop reason: SDUPTHER

## 2024-02-14 ENCOUNTER — TELEMEDICINE CLINICAL SUPPORT (OUTPATIENT)
Dept: NUTRITION | Facility: CLINIC | Age: 54
End: 2024-02-14
Payer: COMMERCIAL

## 2024-02-14 DIAGNOSIS — E11.9 DIABETES MELLITUS WITHOUT COMPLICATION (MULTI): Primary | ICD-10-CM

## 2024-02-14 PROCEDURE — 97803 MED NUTRITION INDIV SUBSEQ: CPT | Performed by: DIETITIAN, REGISTERED

## 2024-02-14 NOTE — PROGRESS NOTES
Reason for Nutrition Visit:  Pt is a 53 y.o. female being seen at [facility] as she was referred for diabetes by Dr. Dian Canales on 6.27.23. 1. Diabetes mellitus without complication (CMS/HCC)           Current Medical  Patient Active Problem List   Diagnosis    Diabetes mellitus without complication (CMS/HCC)    GERD without esophagitis    IBS (irritable bowel syndrome)    Fibromyalgia    Other hyperlipidemia    Acne vulgaris    Cataract, nuclear sclerotic, both eyes    Macrocytosis    Photopsia    Other hypertrophic disorders of the skin    Scar condition and fibrosis of skin    Chronic headaches    Chest pain, atypical    Chronic low back pain    Overactive bladder    Retinal hole or tear, bilateral    Combined form of age-related cataract, both eyes    Myopia of both eyes    Astigmatism of both eyes    Presbyopia      Current Outpatient Medications on File Prior to Visit   Medication Sig Dispense Refill    atorvastatin (Lipitor) 20 mg tablet Take 1 tablet (20 mg) by mouth once daily. 90 tablet 3    calcium carbonate-vitamin D3 600 mg-5 mcg (200 unit) tablet Take 1 tablet by mouth once daily.      cholecalciferol (Vitamin D-3) 25 MCG (1000 UT) capsule Take 3 tablets by mouth once daily.      cyanocobalamin (Vitamin B-12) 1,000 mcg tablet Take 100 mcg by mouth once daily.      dicyclomine (Bentyl) 10 mg capsule Take 1 capsule (10 mg) by mouth 3 times a day. 90 capsule 1    dulaglutide (Trulicity) 3 mg/0.5 mL pen injector Inject 3 mg under the skin 1 (one) time per week. 2 mL 2    fexofenadine (Allegra) 60 mg tablet Take 1 tablet (60 mg) by mouth once daily.      fluconazole (Diflucan) 150 mg tablet Take 1 tablet (150 mg) by mouth once a week.      folic acid (Folvite) 1 mg tablet Take by mouth.      omeprazole (PriLOSEC) 10 mg packet for oral suspension Take by mouth.      spironolactone (Aldactone) 50 mg tablet TAKE 1 TABLET BY MOUTH DAILY 90 tablet 0    [DISCONTINUED] spironolactone (Aldactone) 50 mg tablet  "TAKE 1 TABLET BY MOUTH DAILY 90 tablet 0     No current facility-administered medications on file prior to visit.      Anthropometrics:      Weight change:   Height: 167.6 cm (5' 5.98\")  Weight: 81 kg (178 lb 9.2 oz)    Lab Results   Component Value Date    HGBA1C 6.2 (H) 12/16/2023    CHOL 149 12/16/2023    LDLF 73 12/15/2022    TRIG 50 12/16/2023        Chemistry    Lab Results   Component Value Date/Time     12/16/2023 0926    K 4.0 12/16/2023 0926     12/16/2023 0926    CO2 25 12/16/2023 0926    BUN 16 12/16/2023 0926    CREATININE 0.95 12/16/2023 0926    Lab Results   Component Value Date/Time    CALCIUM 9.8 12/16/2023 0926    ALKPHOS 72 12/16/2023 0926    AST 14 12/16/2023 0926    ALT 14 12/16/2023 0926    BILITOT 0.6 12/16/2023 0926         Food and Nutrition Hx:  Pt has been seen by RDN since June 2008 for both wt loss and diabetes. Her lowest wt achieved was 147 lbs. She has not been seen since 2022. Pt reports since this time her blood glucose had increased to 7.5%. She reports she was driven to eat. She recently started Trulicity and this has helped curb cravings. She does not test her blood glucose.     Pt had a follow-up appt. A1c reduced from 7.5% to 6.2%. She is still on Trucility. She is working on using the plate. She knows about CHO counting. Her work still provides a lot of pressure and she may not always reflect on her eating.     Pt wakes up at 4:00 am. She eats 3 meals and 2-3  snacks per day.     24 Diet Recall:  Meal 1: Breakfast is at 6:00- 7:00 to include 0.5 peanut butter sandwich with a few slices of apple (CHO 25, protein 5)  Snack: 2 -3 apple slices of apple   Meal 2: Lunch is at 12:00- 1:00 to include apple with sometimes 0.5 tuna sandwich (CHO 20, protein 0-15)  Snack is at 4:00 to include 1/2 sandwich with peanut butter. She may not always consumed the sandwich due to being busy.   Meal 3: Dinner is at 6:00 to include 4 ounces of shrimp or salmon with a cup of green beans " "with up to a cup of long grain rice (CHO 35-50, protein 28)  Snack is at 8:00 to include peanut butter and bread (CHO 15)   Beverages: water throughout the day.     Allergies: None  Intolerance: None  Appetite: Good  Intake: >75%  GI Symptoms : None Frequency: absent  Swallowing Difficulty: No problems with swallowing  Dentition : own    Types of Activities: Mostly Sedentary  Duration: <30 minutes irregularly    Sleep duration/quality : 7+ hours and disrupted sleep  Sleep disorders: none    Supplements: Vitamin B12, Vitamin D, Calcium, and Folate daily    Feelings of Hunger?: Yes and will eat  Physical Feeling: Physically full  Binging: Never  Cravings: None  Energy Levels: Stable    Food Preparation: Patient  Cooking Skills/Barriers: None reported  Grocery Shopping: Patient    Eating Out Type: Dinner daily  Convenience Foods: Denies  rare    Nutrition Focused Physical Exam:    Performed/Deferred: Performed    Muscle Wasting:  Temporal: None  Shoulder: None  None  Interosseous Muscle: None  Quadriceps: None    Loss of Subcutaneous Fat:  Eyes: None  Perioral: None  Triceps: None  Chest: None    Other Physical Findings:  Hair: None  None  Mouth: None  Skin: None  Nails: None  none    Malnutrition Present: No    Estimated Energy Needs:  Energy Needs      Calculated Energy Needs Using Equations  Height: 167.6 cm (5' 5.98\")  Weight Used for Equation Calculations: 81 kg (178 lb 9.2 oz)  Minna Basurto Equation (Overweight or Obese Patients): 1442  Equation Chosen to Use by RD: [place]  Activity Factor: 1.2  Total Energy Needs: 1730  Estimated Energy Needs  Total Energy Estimated Needs (kCal): 1230 kCal  Method for Estimating Needs: MSJ x AF -500 calories for wt loss.     Nutrition Diagnosis:    Diagnosis Statement 1:  Diagnosis Status: Ongoing/improving   Diagnosis : Altered nutrition related lab values  related to  change in meal structure and schedule in the presence of diabetes   as evidenced by  A1c is at " 7.5%    Diagnosis Statement 2:  Diagnosis Status: Ongoing/improving  Diagnosis : Inconsistent carbohydrate intake  related to  limited time to apply to meals coupled  as evidenced by  CHO at meals range between 15-50+ grams     Diagnosis Statement 3:   Diagnosis Status: Ongoing/improving  Diagnosis : Imbalance of nutrients  related to  limited/omitted protein intake at times at meals and snacks when consuming CHO in the presence of diabetes  as evidenced by  protein can be omitted at meals and snacks and overall protein intake appears to be lower than recommendation    Nutrition Interventions:  Medical nutrition therapy was given for diabetes.   Nutrition Counseling: Motivational Interviewing  Coordination of Care: None    Nutrition Goals:  1,230 calories per day for 1 lb wt loss per week. CHO consistent meal plan with aim for 30-45 grams of CHO at meals and 15 grams at snacks to help reduce A1c. Heart healthy meal plan with a low saturated fat intake to <5 -6 % of energy (less than 8 grams of saturated fat per day), reduced intake of added sugars (<10% of total energy), 25 grams of fiber with intake of viscous fiber to 5 g/day to 10 g/day, plant sterols/stanols to 2 g/day, and 1.1 gram of omega three fatty acids per day since a statin is being taken. 1,500 mg sodium restriction per day.     Nutrition Recommendations:  Via teach back method patient verbalized understanding of the following topics:  1) Strive to set aside some time 5 days a week for a hunger awareness meditation or other mindfulness meditation.   2) Prior to eating or selecting food to eat, consider 3 reset breathes. This is where the eyes are closed and focus on 3 slow deep breathes.      Educational Handouts: Mindful Eating Meditation-hunger awareness, Plate method for Diabetes (2022)     Delia Iraheta, MS, RDN, LD, KATHERINE   Advanced Practice Clinical Dietitian  [email or web address]  Schedule line [phone]  Direct line [phone]      Readiness to Change : Good  Level of Understanding : Good  Anticipated Compliant : Good

## 2024-03-01 ENCOUNTER — APPOINTMENT (OUTPATIENT)
Dept: PHARMACY | Facility: HOSPITAL | Age: 54
End: 2024-03-01
Payer: COMMERCIAL

## 2024-03-02 ENCOUNTER — PHARMACY VISIT (OUTPATIENT)
Dept: PHARMACY | Facility: CLINIC | Age: 54
End: 2024-03-02
Payer: MEDICARE

## 2024-03-02 PROCEDURE — RXMED WILLOW AMBULATORY MEDICATION CHARGE

## 2024-03-20 ENCOUNTER — TELEMEDICINE (OUTPATIENT)
Dept: DERMATOLOGY | Facility: CLINIC | Age: 54
End: 2024-03-20
Payer: COMMERCIAL

## 2024-03-20 DIAGNOSIS — L20.89 OTHER ATOPIC DERMATITIS: ICD-10-CM

## 2024-03-20 DIAGNOSIS — L70.0 ACNE VULGARIS: Primary | ICD-10-CM

## 2024-03-20 PROCEDURE — 99214 OFFICE O/P EST MOD 30 MIN: CPT | Performed by: DERMATOLOGY

## 2024-03-20 PROCEDURE — 1036F TOBACCO NON-USER: CPT | Performed by: DERMATOLOGY

## 2024-03-20 RX ORDER — TACROLIMUS 1 MG/G
OINTMENT TOPICAL 2 TIMES DAILY
Qty: 100 G | Refills: 3 | Status: SHIPPED | OUTPATIENT
Start: 2024-03-20 | End: 2025-03-20

## 2024-03-20 RX ORDER — SPIRONOLACTONE 50 MG/1
50 TABLET, FILM COATED ORAL DAILY
Qty: 90 TABLET | Refills: 2 | Status: SHIPPED | OUTPATIENT
Start: 2024-03-20

## 2024-03-20 RX ORDER — CLINDAMYCIN PHOSPHATE AND BENZOYL PEROXIDE 10; 50 MG/G; MG/G
1 GEL TOPICAL EVERY MORNING
Qty: 50 G | Refills: 11 | Status: SHIPPED | OUTPATIENT
Start: 2024-03-20

## 2024-03-20 RX ORDER — CLINDAMYCIN PHOSPHATE AND TRETINOIN GEL 1.2%/0.025% 10; .25 MG/G; MG/G
GEL TOPICAL NIGHTLY
Qty: 60 G | Refills: 11 | Status: SHIPPED | OUTPATIENT
Start: 2024-03-20 | End: 2025-03-20

## 2024-03-20 RX ORDER — TRIAMCINOLONE ACETONIDE 1 MG/G
OINTMENT TOPICAL 3 TIMES WEEKLY
Qty: 80 G | Refills: 3 | Status: SHIPPED | OUTPATIENT
Start: 2024-03-20

## 2024-03-20 NOTE — PROGRESS NOTES
Subjective Lashone D Lemon is a 53 y.o. female who presents for the following: No chief complaint on file..     Last derm visit 8/2023 for acne and eczema.  Plan at that time. Continue ziana in evenings at bedtime-Continue Onexton 1.2%-3.75% topical gel.-Continue spironolactone 50mg daily. Potassium last checked 6/2023 wnl. For eczema - for arms uses triamcinolone as needed, using MWF and she likes this plan.- for neck, uses tacrolimus (protopic) every morning         She has one small break-out recently on chin and another on lip but they have gone away    She still uses protopic around neck for eczema daily. Arms ar clear, she uses triamcinolone daily. Uses lubriderm white lotion and CeraVe cream.       Review of Systems:  No other skin or systemic complaints other than what is documented elsewhere in the note.    The following portions of the chart were reviewed this encounter and updated as appropriate:          Skin Cancer History  No skin cancer on file.      Specialty Problems          Dermatology Problems    Acne vulgaris    Other hypertrophic disorders of the skin    Scar condition and fibrosis of skin        Objective   Well appearing patient in no apparent distress; mood and affect are within normal limits.    A focused skin examination was performed. All findings within normal limits unless otherwise noted below.    Assessment/Plan   1. Acne vulgaris  Head - Anterior (Face)  Clear on exam today    -Discussed the nature of the condition  - well-controlled, stay on same regimen    - potassium checked 12/2023 wnl    -Discussed/information given on the potential adverse effects of spironolactone including but not limited to hypotension, diuresis, muscle cramps, fatigue, breast tenderness, menstrual irregularities, hyperkalemia which may result in muscle dysfunction (although current evidence suggests no need to monitor potassium levels in young healthy adult females).   -Discussed common side effects of  lightheadedness or dizziness, which usually resolve within a few days as the body adjusts to the medication if fluid intake is appropriate. Discussed if the patient is feeling unwell or fainting, to discontinue the medication and contact our office.  -Discussed not to take any prescription potassium supplements while taking spironolactone, and to avoid excessive consumption of food/drink containing potassium (such as coconut water).  -There is a black box warning of tumorigenesis in rodents taking very high doses of spironolactone. Epidemiologic studies have shown no increase in malignancy in humans.   -If patient is of child bearing potential, patient counseled that they should NOT get pregnant while on this medication as there is risk of teratogenicity/birth defects and needs to use contraception while taking this medication      clindamycin-tretinoin (Ziana) gel - Head - Anterior (Face)  Apply topically once daily at bedtime.    Related Procedures  Follow Up In Dermatology - Established Patient    Related Medications  spironolactone (Aldactone) 50 mg tablet  Take 1 tablet (50 mg) by mouth once daily.    clindamycin-benzoyl peroxide (Onexton) 1.2 %(1 % base) -3.75 % gel with pump  Apply 1 Application topically once daily in the morning.    2. Other atopic dermatitis  Left Arm, Neck - Anterior, Right Arm  Clear on exam today    -Discussed the nature of condition  - discussed decreasing triamcinolone to only 3x per week, she knows she should not use daily and she will try  - she can try alternating with protopic      - continue gentle skin care    Related Medications  tacrolimus (Protopic) 0.1 % ointment  Apply topically 2 times a day. To areas of eczema on neck and face. Can also use for eczema on arms    triamcinolone (Kenalog) 0.1 % ointment  Apply topically 3 times a week. For eczema on arms. Try alternating with Protopic        Follow up 3-6 months acne virtual visit ( prefers 3 months)

## 2024-03-22 ENCOUNTER — TELEMEDICINE CLINICAL SUPPORT (OUTPATIENT)
Dept: NUTRITION | Facility: CLINIC | Age: 54
End: 2024-03-22
Payer: COMMERCIAL

## 2024-03-22 DIAGNOSIS — E11.9 DIABETES MELLITUS WITHOUT COMPLICATION (MULTI): Primary | ICD-10-CM

## 2024-03-22 PROCEDURE — 97803 MED NUTRITION INDIV SUBSEQ: CPT | Performed by: DIETITIAN, REGISTERED

## 2024-03-22 NOTE — PROGRESS NOTES
Reason for Nutrition Visit:  Pt is a 53 y.o. female being seen at [facility] as she was referred for diabetes by Dr. Dian Canales on 6.27.23. 1. Diabetes mellitus without complication (CMS/HCC)           Current Medical  Patient Active Problem List   Diagnosis    Diabetes mellitus without complication (CMS/HCC)    GERD without esophagitis    IBS (irritable bowel syndrome)    Fibromyalgia    Other hyperlipidemia    Acne vulgaris    Cataract, nuclear sclerotic, both eyes    Macrocytosis    Photopsia    Other hypertrophic disorders of the skin    Scar condition and fibrosis of skin    Chronic headaches    Chest pain, atypical    Chronic low back pain    Overactive bladder    Retinal hole or tear, bilateral    Combined form of age-related cataract, both eyes    Myopia of both eyes    Astigmatism of both eyes    Presbyopia      Current Outpatient Medications on File Prior to Visit   Medication Sig Dispense Refill    atorvastatin (Lipitor) 20 mg tablet Take 1 tablet (20 mg) by mouth once daily. 90 tablet 3    calcium carbonate-vitamin D3 600 mg-5 mcg (200 unit) tablet Take 1 tablet by mouth once daily.      cholecalciferol (Vitamin D-3) 25 MCG (1000 UT) capsule Take 3 tablets by mouth once daily.      clindamycin-benzoyl peroxide (Onexton) 1.2 %(1 % base) -3.75 % gel with pump Apply 1 Application topically once daily in the morning. 50 g 11    clindamycin-tretinoin (Ziana) gel Apply topically once daily at bedtime. 60 g 11    cyanocobalamin (Vitamin B-12) 1,000 mcg tablet Take 100 mcg by mouth once daily.      dicyclomine (Bentyl) 10 mg capsule Take 1 capsule (10 mg) by mouth 3 times a day. 90 capsule 1    dulaglutide (Trulicity) 3 mg/0.5 mL pen injector Inject 3 mg under the skin 1 (one) time per week. 2 mL 2    fexofenadine (Allegra) 60 mg tablet Take 1 tablet (60 mg) by mouth once daily.      fluconazole (Diflucan) 150 mg tablet Take 1 tablet (150 mg) by mouth once a week.      folic acid (Folvite) 1 mg tablet Take by  "mouth.      omeprazole (PriLOSEC) 10 mg packet for oral suspension Take by mouth.      spironolactone (Aldactone) 50 mg tablet Take 1 tablet (50 mg) by mouth once daily. 90 tablet 2    tacrolimus (Protopic) 0.1 % ointment Apply topically 2 times a day. To areas of eczema on neck and face. Can also use for eczema on arms 100 g 3    triamcinolone (Kenalog) 0.1 % ointment Apply topically 3 times a week. For eczema on arms. Try alternating with Protopic 80 g 3    [DISCONTINUED] spironolactone (Aldactone) 50 mg tablet TAKE 1 TABLET BY MOUTH DAILY 90 tablet 0     No current facility-administered medications on file prior to visit.      Anthropometrics:      Weight change:   Height: 167.6 cm (5' 5.98\")  Weight: 81 kg (178 lb 9.2 oz)  3/22/24 Weight: 178 lbs     Lab Results   Component Value Date    HGBA1C 6.2 (H) 12/16/2023    CHOL 149 12/16/2023    LDLF 73 12/15/2022    TRIG 50 12/16/2023        Chemistry    Lab Results   Component Value Date/Time     12/16/2023 0926    K 4.0 12/16/2023 0926     12/16/2023 0926    CO2 25 12/16/2023 0926    BUN 16 12/16/2023 0926    CREATININE 0.95 12/16/2023 0926    Lab Results   Component Value Date/Time    CALCIUM 9.8 12/16/2023 0926    ALKPHOS 72 12/16/2023 0926    AST 14 12/16/2023 0926    ALT 14 12/16/2023 0926    BILITOT 0.6 12/16/2023 0926         Food and Nutrition Hx:  Pt has been seen by RDN since June 2008 for both wt loss and diabetes. Her lowest wt achieved was 147 lbs. She has not been seen since 2022. Pt reports since this time her blood glucose had increased to 7.5%. She reports she was driven to eat. She recently started Trulicity and this has helped curb cravings. She does not test her blood glucose.     Pt had a follow-up appt. A1c reduced from 7.5% to 6.2%. She is still on Trucility. She is working on using the plate. She knows about CHO counting. Her work still provides a lot of pressure and she may not always reflect on her eating. She is also working on " trying to slow down with eating.     Pt wakes up at 4:00 am. She eats 3 meals and 2-3  snacks per day.     24 Diet Recall:  Meal 1: Breakfast is at 6:00- 7:00 to include 0.5 peanut butter sandwich with a few slices of apple (CHO 25, protein 5)  Snack: 2 -3 apple slices of apple   Meal 2: Lunch is at 12:00- 1:00 to include apple with sometimes 0.5 tuna sandwich (CHO 20, protein 0-15)  Snack is at 4:00 to include 1/2 sandwich with peanut butter. She may not always consumed the sandwich due to being busy.   Meal 3: Dinner is at 6:00 to include 4 ounces of shrimp or salmon with a cup of green beans with up to a cup of long grain rice (CHO 35-50, protein 28)  Snack is at 8:00 to include peanut butter and bread (CHO 15)   Beverages: water throughout the day.     Allergies: None  Intolerance: None  Appetite: Good  Intake: >75%  GI Symptoms : None Frequency: absent  Swallowing Difficulty: No problems with swallowing  Dentition : own    Types of Activities: Mostly Sedentary  Duration: <30 minutes irregularly  Pt has been in physical therapy once a week for back pain.     Sleep duration/quality : 7+ hours and disrupted sleep  Sleep disorders: none    Supplements: Vitamin B12, Vitamin D, Calcium, and Folate daily    Feelings of Hunger?: Yes and will eat  Physical Feeling: Physically full  Binging: Never  Cravings: None  Energy Levels: Stable    Food Preparation: Patient  Cooking Skills/Barriers: None reported  Grocery Shopping: Patient    Eating Out Type: Dinner daily  Convenience Foods: Denies  rare    Nutrition Focused Physical Exam:    Performed/Deferred: Performed    Muscle Wasting:  Temporal: None  Shoulder: None  None  Interosseous Muscle: None  Quadriceps: None    Loss of Subcutaneous Fat:  Eyes: None  Perioral: None  Triceps: None  Chest: None    Other Physical Findings:  Hair: None  None  Mouth: None  Skin: None  Nails: None  none    Malnutrition Present: No    Estimated Energy Needs:  Energy Needs      Calculated  "Energy Needs Using Equations  Height: 167.6 cm (5' 5.98\")  Weight Used for Equation Calculations: 81 kg (178 lb 9.2 oz)  Minna Basurto Equation (Overweight or Obese Patients): 1442  Equation Chosen to Use by RD: LisaBrie Lunaor  Activity Factor: 1.2  Total Energy Needs: 1730  Estimated Energy Needs  Total Energy Estimated Needs (kCal): 1230 kCal  Method for Estimating Needs: MSJ x AF -500 calories for wt loss.     Nutrition Diagnosis:    Diagnosis Statement 1:  Diagnosis Status: Ongoing/improving   Diagnosis : Altered nutrition related lab values  related to  change in meal structure and schedule in the presence of diabetes   as evidenced by  A1c is at 7.5%    Diagnosis Statement 2:  Diagnosis Status: Ongoing/improving  Diagnosis : Inconsistent carbohydrate intake  related to  limited time to apply to meals coupled  as evidenced by  CHO at meals range between 15-50+ grams     Diagnosis Statement 3:   Diagnosis Status: Ongoing/improving  Diagnosis : Imbalance of nutrients  related to  limited/omitted protein intake at times at meals and snacks when consuming CHO in the presence of diabetes  as evidenced by  protein can be omitted at meals and snacks and overall protein intake appears to be lower than recommendation    Nutrition Interventions:  Medical nutrition therapy was given for diabetes.   Nutrition Counseling: Motivational Interviewing  Coordination of Care: None    Nutrition Goals:  1,230 calories per day for 1 lb wt loss per week. CHO consistent meal plan with aim for 30-45 grams of CHO at meals and 15 grams at snacks to help reduce A1c. Heart healthy meal plan with a low saturated fat intake to <5 -6 % of energy (less than 8 grams of saturated fat per day), reduced intake of added sugars (<10% of total energy), 25 grams of fiber with intake of viscous fiber to 5 g/day to 10 g/day, plant sterols/stanols to 2 g/day, and 1.1 gram of omega three fatty acids per day since a statin is being taken. 1,500 mg " sodium restriction per day.     Nutrition Recommendations:  Via teach back method patient verbalized understanding of the following topics:  1) Strive to set aside some time 5 days a week for a hunger awareness meditation or other mindfulness meditation.   2) Prior to eating or selecting food to eat, consider 3 reset breathes. This is where the eyes are closed and focus on 3 slow deep breathes.  3) Use the hunger scale to identify moderate levels of hunger to help recognize and respond to hunger.     Educational Handouts: Hunger Scale, Mindful Eating Meditation-hunger awareness, Plate method for Diabetes (2022)     Delia Iraheta, MS, RDN, LD, KATHERINE   Advanced Practice Clinical Dietitian  [email or web address]  Schedule line [phone]  Direct line [phone]     Readiness to Change : Good  Level of Understanding : Good  Anticipated Compliant : Good
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
## 2024-03-26 ENCOUNTER — OFFICE VISIT (OUTPATIENT)
Dept: PRIMARY CARE | Facility: CLINIC | Age: 54
End: 2024-03-26
Payer: COMMERCIAL

## 2024-03-26 VITALS
HEART RATE: 79 BPM | WEIGHT: 183 LBS | BODY MASS INDEX: 29.54 KG/M2 | SYSTOLIC BLOOD PRESSURE: 107 MMHG | DIASTOLIC BLOOD PRESSURE: 74 MMHG

## 2024-03-26 DIAGNOSIS — E11.9 TYPE 2 DIABETES MELLITUS WITHOUT COMPLICATION, WITHOUT LONG-TERM CURRENT USE OF INSULIN (MULTI): Primary | ICD-10-CM

## 2024-03-26 DIAGNOSIS — M54.50 CHRONIC LOW BACK PAIN, UNSPECIFIED BACK PAIN LATERALITY, UNSPECIFIED WHETHER SCIATICA PRESENT: ICD-10-CM

## 2024-03-26 DIAGNOSIS — E11.9 DIABETES MELLITUS WITHOUT COMPLICATION (MULTI): ICD-10-CM

## 2024-03-26 DIAGNOSIS — G89.29 CHRONIC LOW BACK PAIN, UNSPECIFIED BACK PAIN LATERALITY, UNSPECIFIED WHETHER SCIATICA PRESENT: ICD-10-CM

## 2024-03-26 LAB — POC HEMOGLOBIN A1C: 6.6 % (ref 4.2–6.5)

## 2024-03-26 PROCEDURE — 99214 OFFICE O/P EST MOD 30 MIN: CPT | Performed by: INTERNAL MEDICINE

## 2024-03-26 PROCEDURE — 3078F DIAST BP <80 MM HG: CPT | Performed by: INTERNAL MEDICINE

## 2024-03-26 PROCEDURE — 83036 HEMOGLOBIN GLYCOSYLATED A1C: CPT | Performed by: INTERNAL MEDICINE

## 2024-03-26 PROCEDURE — 3074F SYST BP LT 130 MM HG: CPT | Performed by: INTERNAL MEDICINE

## 2024-03-26 PROCEDURE — 1036F TOBACCO NON-USER: CPT | Performed by: INTERNAL MEDICINE

## 2024-03-26 NOTE — ASSESSMENT & PLAN NOTE
Has been following with pharmacy team Ethan now on Trulicity with uptritrated dose to 3mg, tolerating well.   Last A1C slightly worse at 6.6% in the setting of some dietary indiscretions   On: Trulicity 3mg   Albuminuria: 6/23 none    Optho: 2/24 no retinopathy Dr. Behr   Podiatry: rescheduled with Dr. Isaias Noguera   Statin: on atorvastatin   Immunizations: UTD

## 2024-03-26 NOTE — ASSESSMENT & PLAN NOTE
Followed by Dr. Baires at [facility] recommended no surgical management.   Also follows with PT at [facility] in [place] has adequate followup.

## 2024-03-26 NOTE — PATIENT INSTRUCTIONS
Lashone,   Keep up the good work! Continue to work on healthy lifestyle and keep stressors down.   See you in 3 months.

## 2024-03-29 ENCOUNTER — TELEMEDICINE (OUTPATIENT)
Dept: PHARMACY | Facility: HOSPITAL | Age: 54
End: 2024-03-29
Payer: COMMERCIAL

## 2024-03-29 DIAGNOSIS — E11.9 TYPE 2 DIABETES MELLITUS WITHOUT COMPLICATION, WITHOUT LONG-TERM CURRENT USE OF INSULIN (MULTI): ICD-10-CM

## 2024-03-29 DIAGNOSIS — E11.9 DIABETES MELLITUS WITHOUT COMPLICATION (MULTI): Primary | ICD-10-CM

## 2024-03-29 PROCEDURE — RXMED WILLOW AMBULATORY MEDICATION CHARGE

## 2024-03-29 RX ORDER — DULAGLUTIDE 3 MG/.5ML
3 INJECTION, SOLUTION SUBCUTANEOUS
Qty: 2 ML | Refills: 5 | Status: SHIPPED | OUTPATIENT
Start: 2024-03-29

## 2024-03-29 ASSESSMENT — ENCOUNTER SYMPTOMS: DIABETIC ASSOCIATED SYMPTOMS: 0

## 2024-03-29 NOTE — PROGRESS NOTES
Subjective   Patient ID: Lashone D Lemon is a 53 y.o. female who presents for Diabetes.    Referring Provider: Dian Canales DO     Next PCP Visit: 6/27/24    Diabetes  She presents for her follow-up diabetic visit. She has type 2 diabetes mellitus. Her disease course has been stable. There are no hypoglycemic associated symptoms. There are no diabetic associated symptoms. There are no hypoglycemic complications. There are no diabetic complications. Risk factors for coronary artery disease include dyslipidemia and diabetes mellitus. Current diabetic treatments: GLP-1 monotherapy. She is compliant with treatment all of the time. Her weight is fluctuating minimally. She is following a generally healthy and diabetic diet. Meal planning includes avoidance of concentrated sweets. Exercise: Using treadmill, walking more. An ACE inhibitor/angiotensin II receptor blocker is not being taken. She sees a podiatrist.Eye exam is current.       Review of Systems    Medication System Management:  Affordability/Accessibility: none  Adherence/Organization: none  Adverse Effects: none    Objective     There were no vitals taken for this visit.     Labs  Lab Results   Component Value Date    BILITOT 0.6 12/16/2023    CALCIUM 9.8 12/16/2023    CO2 25 12/16/2023     12/16/2023    CREATININE 0.95 12/16/2023    GLUCOSE 97 12/16/2023    ALKPHOS 72 12/16/2023    K 4.0 12/16/2023    PROT 7.7 12/16/2023     12/16/2023    AST 14 12/16/2023    ALT 14 12/16/2023    BUN 16 12/16/2023    ANIONGAP 14 12/16/2023    MG 2.08 06/27/2023    PHOS 3.7 05/19/2018    ALBUMIN 4.6 12/16/2023    GFRF >90 06/27/2023     Lab Results   Component Value Date    TRIG 50 12/16/2023    CHOL 149 12/16/2023    LDLCALC 80 12/16/2023    HDL 58.7 12/16/2023     Lab Results   Component Value Date    HGBA1C 6.6 (A) 03/26/2024       Current Outpatient Medications on File Prior to Visit   Medication Sig Dispense Refill    atorvastatin (Lipitor) 20 mg tablet Take  1 tablet (20 mg) by mouth once daily. 90 tablet 3    calcium carbonate-vitamin D3 600 mg-5 mcg (200 unit) tablet Take 1 tablet by mouth once daily.      cholecalciferol (Vitamin D-3) 25 MCG (1000 UT) capsule Take 3 tablets by mouth once daily.      clindamycin-benzoyl peroxide (Onexton) 1.2 %(1 % base) -3.75 % gel with pump Apply 1 Application topically once daily in the morning. 50 g 11    clindamycin-tretinoin (Ziana) gel Apply topically once daily at bedtime. 60 g 11    cyanocobalamin (Vitamin B-12) 1,000 mcg tablet Take 100 mcg by mouth once daily.      dicyclomine (Bentyl) 10 mg capsule Take 1 capsule (10 mg) by mouth 3 times a day. 90 capsule 1    fexofenadine (Allegra) 60 mg tablet Take 1 tablet (60 mg) by mouth once daily.      fluconazole (Diflucan) 150 mg tablet Take 1 tablet (150 mg) by mouth once a week.      folic acid (Folvite) 1 mg tablet Take by mouth.      omeprazole (PriLOSEC) 10 mg packet for oral suspension Take by mouth.      spironolactone (Aldactone) 50 mg tablet Take 1 tablet (50 mg) by mouth once daily. 90 tablet 2    tacrolimus (Protopic) 0.1 % ointment Apply topically 2 times a day. To areas of eczema on neck and face. Can also use for eczema on arms 100 g 3    triamcinolone (Kenalog) 0.1 % ointment Apply topically 3 times a week. For eczema on arms. Try alternating with Protopic 80 g 3    [DISCONTINUED] dulaglutide (Trulicity) 3 mg/0.5 mL pen injector Inject 3 mg under the skin 1 (one) time per week. 2 mL 2     No current facility-administered medications on file prior to visit.        Assessment/Plan   Problem List Items Addressed This Visit             ICD-10-CM    Diabetes mellitus without complication (CMS/HCC) - Primary E11.9     Last A1c 6.6% 3/26/24. Which is somewhat increased from previous 6.2%  Do not think this warrants therapeutic escalation at this time.  Patient does well with diet and lifestyle. Is using exercise equipment more and plans to get out for more  walking.    Plan:  Continue monotherapy with Trulicity 3 mg weekly.         Relevant Medications    dulaglutide (Trulicity) 3 mg/0.5 mL pen injector    Other Relevant Orders    Follow Up In Advanced [facility] - Pharmacy     Other Visit Diagnoses         Codes    Type 2 diabetes mellitus without complication, without long-term current use of insulin (CMS/[facility])     E11.9    Relevant Medications    dulaglutide (Trulicity) 3 mg/0.5 mL pen injector        Follow up: 1 month after next PCP visit 6/27/24    Ethan Duenas, PharmD    Continue all meds under the continuation of care with the referring provider and clinical pharmacy team.

## 2024-03-29 NOTE — ASSESSMENT & PLAN NOTE
Last A1c 6.6% 3/26/24. Which is somewhat increased from previous 6.2%  Do not think this warrants therapeutic escalation at this time.  Patient does well with diet and lifestyle. Is using exercise equipment more and plans to get out for more walking.    Plan:  Continue monotherapy with Trulicity 3 mg weekly.

## 2024-03-30 ENCOUNTER — PHARMACY VISIT (OUTPATIENT)
Dept: PHARMACY | Facility: CLINIC | Age: 54
End: 2024-03-30
Payer: MEDICARE

## 2024-04-02 ENCOUNTER — EVALUATION (OUTPATIENT)
Dept: PHYSICAL THERAPY | Facility: CLINIC | Age: 54
End: 2024-04-02
Payer: COMMERCIAL

## 2024-04-02 DIAGNOSIS — N32.81 OVERACTIVE BLADDER: Primary | ICD-10-CM

## 2024-04-02 NOTE — PROGRESS NOTES
Physical Therapy                 Therapy Communication Note    Patient Name: Lashone D Lemon  MRN: [id number]  Today's Date: 4/2/2024     Discipline: Physical Therapy    Missed Visit Reason:  unknown    Missed Time: No Show

## 2024-04-29 PROCEDURE — RXMED WILLOW AMBULATORY MEDICATION CHARGE

## 2024-04-30 ENCOUNTER — PHARMACY VISIT (OUTPATIENT)
Dept: PHARMACY | Facility: CLINIC | Age: 54
End: 2024-04-30
Payer: MEDICARE

## 2024-05-21 ENCOUNTER — EVALUATION (OUTPATIENT)
Dept: PHYSICAL THERAPY | Facility: CLINIC | Age: 54
End: 2024-05-21
Payer: COMMERCIAL

## 2024-05-21 ENCOUNTER — APPOINTMENT (OUTPATIENT)
Dept: PHYSICAL THERAPY | Facility: CLINIC | Age: 54
End: 2024-05-21
Payer: COMMERCIAL

## 2024-05-21 DIAGNOSIS — R35.0 URINARY FREQUENCY: ICD-10-CM

## 2024-05-21 DIAGNOSIS — R39.15 URGENCY OF URINATION: ICD-10-CM

## 2024-05-21 DIAGNOSIS — M62.89 HIGH-TONE PELVIC FLOOR DYSFUNCTION: ICD-10-CM

## 2024-05-21 PROCEDURE — 97161 PT EVAL LOW COMPLEX 20 MIN: CPT | Mod: GP

## 2024-05-21 PROCEDURE — 97535 SELF CARE MNGMENT TRAINING: CPT | Mod: GP

## 2024-05-21 ASSESSMENT — ENCOUNTER SYMPTOMS
OCCASIONAL FEELINGS OF UNSTEADINESS: 0
LOSS OF SENSATION IN FEET: 0
DEPRESSION: 0

## 2024-05-21 ASSESSMENT — PATIENT HEALTH QUESTIONNAIRE - PHQ9
1. LITTLE INTEREST OR PLEASURE IN DOING THINGS: NOT AT ALL
2. FEELING DOWN, DEPRESSED OR HOPELESS: NOT AT ALL
SUM OF ALL RESPONSES TO PHQ9 QUESTIONS 1 AND 2: 0

## 2024-05-21 NOTE — PROGRESS NOTES
Physical Therapy    EVALUATION AND TREATMENT    Name: Lashone D Lemon  MRN: [id number]  : 1970  Today's Date: 24     Time Calculation  Start Time: 1710  Stop Time: 1750  Time Calculation (min): 40 min    Assessment:    Pt presenting to the clinic with history of urinary dysfunction with urgency and frequency. At this point, pt reports no abnormalities. This is not impacting her quality of life anymore. There was increased dialogue/conversation with patient trying to determine if PFPT evaluation was necessary and at this point, pt would not be a good candidate for PFPT at this time. Discussed that she may be able to return if symptoms arise and contact information given.     Insurance:  Insurance Type: [facility]  Visit number: 1  Approved # of visits: 75 combined PT/OT/ST  Authorization Needed: no  Cert Date Ends On: n/a   $30 copay    Plan:   PT Plan: No Additional PT interventions required at this time  PT Frequency: One time visit  Number of Treatments Authorized: 75 combined PT/OT/ST  Rehab Potential: Excellent  Plan of Care Agreement: Patient  Planned interventions include: biofeedback, cryotherapy, education/instruction, electrical stimulation, gait training, home program, hot pack, kinesiotaping, manual therapy, neuromuscular re-education, self care/home management, therapeutic activities, and therapeutic exercises.       Current Problem:  1. Urinary frequency  Referral to Physical Therapy      2. High-tone pelvic floor dysfunction  Referral to Physical Therapy      3. Urgency of urination  Referral to Physical Therapy          Subjective   General:  Reason for Referral: Pelvic floor dysfunction  Referred By: Dr. Rothman  Primary complaint: urinary frequency    This has been ongoing since 2018, when she had an elevated HA1C. She is currently on a weekly Trulicity injection and feels like this has been immensely helping. She has tried myrbetriq in the past without success.     Bladder:  Daytime  frequency: 6 times per day, voiding every two hours   Night time frequency: 2 times per day   No leakage   She purchased urinary liners but has not used them yet   No urgency  Complete emptying   No post void leakage  No pain     1 adopted child (age 5) but no vaginal or c section deliveries  She thinks she may be perimenopausal   No vaginal bulging or pressure    She is working as a  at the [employer]   She denies any bowel related complaints, no fecal or flatal incontinence.   Precautions:  Precautions Comment: none     PMH: HLD, DM, cataracts, GERD, IBS, OAB  Fall Risk: no  Pain:  Pain Assessment: 0-10  Pain Score: 0 - No pain    Objective   AROM:  Trunk and spine WFL   PROM/Joint Mobility:  Hip R/L WFL   Strength:  Hip abduction and extension within expected levels   Observation:   No gait abnormalities  No transfer abnormalities     Outcome Measure:   NIH CPSI pain 0 NIH CPSI urination 0 NIH CPSI quality of life 1     Careplan Goals:  1. Pt will be independent in HEP to maximize PT POC       Ese Russo, PT

## 2024-05-28 ASSESSMENT — PAIN - FUNCTIONAL ASSESSMENT: PAIN_FUNCTIONAL_ASSESSMENT: 0-10

## 2024-05-28 ASSESSMENT — PAIN SCALES - GENERAL: PAINLEVEL_OUTOF10: 0 - NO PAIN

## 2024-06-03 ENCOUNTER — TELEMEDICINE (OUTPATIENT)
Dept: PHARMACY | Facility: HOSPITAL | Age: 54
End: 2024-06-03
Payer: COMMERCIAL

## 2024-06-03 ENCOUNTER — PHARMACY VISIT (OUTPATIENT)
Dept: PHARMACY | Facility: CLINIC | Age: 54
End: 2024-06-03
Payer: MEDICARE

## 2024-06-03 DIAGNOSIS — E11.9 DIABETES MELLITUS WITHOUT COMPLICATION (MULTI): ICD-10-CM

## 2024-06-03 PROCEDURE — RXMED WILLOW AMBULATORY MEDICATION CHARGE

## 2024-06-03 RX ORDER — DULAGLUTIDE 1.5 MG/.5ML
1.5 INJECTION, SOLUTION SUBCUTANEOUS
Qty: 2 ML | Refills: 0 | Status: SHIPPED | OUTPATIENT
Start: 2024-06-03

## 2024-06-03 ASSESSMENT — ENCOUNTER SYMPTOMS: DIABETIC ASSOCIATED SYMPTOMS: 0

## 2024-06-03 NOTE — PROGRESS NOTES
I reviewed the progress note and agree with the resident’s findings and plans as written. Case discussed with resident.    Karena Anderson, PharmD

## 2024-06-03 NOTE — ASSESSMENT & PLAN NOTE
Patient is currently taking Trulicity 3 mg. She missed her dose on Friday, 5/31 due to backorder. A1c 6.6% (3/26/24), which is at goal of < 7%. She reports that the Trulicity is still going well and she is not having any side effects. Will plan to stay at Trulicity 1.5 mg dose while 3 mg dose is on backorder. Will follow up before PCP visit on 6/27 to see if 3 mg back in stock.     Plan:  DECREASE Trulicity dose to 1.5 mg weekly - Prescription sent to [facility]

## 2024-06-03 NOTE — PROGRESS NOTES
Patient ID: Lashone D Lemon is a 53 y.o. female who presents for Diabetes.    Referring Provider: Dian Canales DO  PCP: Dian Canales DO Last visit with PCP: 3/26/24 Next visit with PCP: 6/27/24      Subjective   Treatment Adherence:   Patient did take her medications today.   Number of missed doses in last 7 days: 1 - Ran out of Trulicity due to backorder     Preferred pharmacy: [facility]  Can patient afford prescribed medications: Yes    Diabetes  She presents for her follow-up diabetic visit. She has type 2 diabetes mellitus. Her disease course has been stable. There are no hypoglycemic associated symptoms. There are no diabetic associated symptoms. There are no hypoglycemic complications. Symptoms are stable. There are no diabetic complications. Risk factors for coronary artery disease include diabetes mellitus. Current diabetic treatments: GLP-1 RA. She is compliant with treatment all of the time. (Patient does not monitor her blood glucose at home. ) An ACE inhibitor/angiotensin II receptor blocker is not being taken.       Objective     There were no vitals taken for this visit.   BP Readings from Last 4 Encounters:   03/26/24 107/74   02/08/24 117/80   12/19/23 120/80   11/10/23 124/84      There were no vitals filed for this visit.     Labs  Lab Results   Component Value Date    BILITOT 0.6 12/16/2023    CALCIUM 9.8 12/16/2023    CO2 25 12/16/2023     12/16/2023    CREATININE 0.95 12/16/2023    GLUCOSE 97 12/16/2023    ALKPHOS 72 12/16/2023    K 4.0 12/16/2023    PROT 7.7 12/16/2023     12/16/2023    AST 14 12/16/2023    ALT 14 12/16/2023    BUN 16 12/16/2023    ANIONGAP 14 12/16/2023    MG 2.08 06/27/2023    PHOS 3.7 05/19/2018    ALBUMIN 4.6 12/16/2023    GFRF >90 06/27/2023     Lab Results   Component Value Date    TRIG 50 12/16/2023    CHOL 149 12/16/2023    LDLCALC 80 12/16/2023    HDL 58.7 12/16/2023     Lab Results   Component Value Date     HGBA1C 6.6 (A) 03/26/2024       Current Outpatient Medications   Medication Instructions    atorvastatin (LIPITOR) 20 mg, oral, Daily    calcium carbonate-vitamin D3 600 mg-5 mcg (200 unit) tablet 1 tablet, oral, Daily    cholecalciferol (Vitamin D-3) 25 MCG (1000 UT) capsule 3 tablets, oral, Daily RT    clindamycin-benzoyl peroxide (Onexton) 1.2 %(1 % base) -3.75 % gel with pump 1 Application, topical (top), Every morning    clindamycin-tretinoin (Ziana) gel Topical, Nightly    cyanocobalamin (VITAMIN B-12) 100 mcg, oral, Daily    dicyclomine (BENTYL) 10 mg, oral, 3 times daily    fexofenadine (ALLEGRA) 60 mg, oral, Daily    fluconazole (DIFLUCAN) 150 mg, oral, Weekly    folic acid (Folvite) 1 mg tablet oral    omeprazole (PriLOSEC) 10 mg packet for oral suspension oral    spironolactone (ALDACTONE) 50 mg, oral, Daily    tacrolimus (Protopic) 0.1 % ointment Topical, 2 times daily, To areas of eczema on neck and face. Can also use for eczema on arms    triamcinolone (Kenalog) 0.1 % ointment Topical, 3 times weekly, For eczema on arms. Try alternating with Protopic    Trulicity 3 mg, subcutaneous, Once Weekly    Trulicity 1.5 mg, subcutaneous, Once Weekly       Drug Interactions;  None at time of review    Assessment/Plan   Problem List Items Addressed This Visit             ICD-10-CM    Diabetes mellitus without complication (Multi) E11.9     Patient is currently taking Trulicity 3 mg. She missed her dose on Friday, 5/31 due to backorder. A1c 6.6% (3/26/24), which is at goal of < 7%. She reports that the Trulicity is still going well and she is not having any side effects. Will plan to stay at Trulicity 1.5 mg dose while 3 mg dose is on backorder. Will follow up before PCP visit on 6/27 to see if 3 mg back in stock.     Plan:  DECREASE Trulicity dose to 1.5 mg weekly - Prescription sent to  [facility]          Relevant Medications    dulaglutide (Trulicity) 1.5 mg/0.5 mL pen injector injection    Other  Relevant Orders    Follow Up In [facility] - Pharmacy     Follow-up: June 21st at 9AM     Time spent with pt: Total length of time 30 (minutes) of the encounter and more than 50% was spent counseling the patient.    Aide Pino, PharmD    Continue all meds under the continuation of care with the referring provider and clinical pharmacy team.    PATIENT EDUCATION/DISCUSSION:  - Counseled patient on MOA, expectations, side effects, duration of therapy, contraindications, administration, and monitoring parameters  - Answered all patient questions and concerns

## 2024-06-21 ENCOUNTER — APPOINTMENT (OUTPATIENT)
Dept: PHARMACY | Facility: HOSPITAL | Age: 54
End: 2024-06-21
Payer: COMMERCIAL

## 2024-06-22 DIAGNOSIS — E11.9 DIABETES MELLITUS WITHOUT COMPLICATION (MULTI): ICD-10-CM

## 2024-06-24 ENCOUNTER — TELEMEDICINE (OUTPATIENT)
Dept: PHARMACY | Facility: HOSPITAL | Age: 54
End: 2024-06-24
Payer: COMMERCIAL

## 2024-06-24 DIAGNOSIS — E11.9 DIABETES MELLITUS WITHOUT COMPLICATION (MULTI): ICD-10-CM

## 2024-06-24 DIAGNOSIS — E11.9 TYPE 2 DIABETES MELLITUS WITHOUT COMPLICATION, WITHOUT LONG-TERM CURRENT USE OF INSULIN (MULTI): ICD-10-CM

## 2024-06-24 PROCEDURE — RXMED WILLOW AMBULATORY MEDICATION CHARGE

## 2024-06-24 RX ORDER — DULAGLUTIDE 1.5 MG/.5ML
1.5 INJECTION, SOLUTION SUBCUTANEOUS
Qty: 2 ML | Refills: 0 | Status: SHIPPED | OUTPATIENT
Start: 2024-06-30 | End: 2024-06-24 | Stop reason: DRUGHIGH

## 2024-06-24 RX ORDER — DULAGLUTIDE 3 MG/.5ML
3 INJECTION, SOLUTION SUBCUTANEOUS
Qty: 2 ML | Refills: 2 | Status: SHIPPED | OUTPATIENT
Start: 2024-06-24 | End: 2024-06-27 | Stop reason: DRUGHIGH

## 2024-06-24 ASSESSMENT — ENCOUNTER SYMPTOMS: DIABETIC ASSOCIATED SYMPTOMS: 0

## 2024-06-24 NOTE — ASSESSMENT & PLAN NOTE
Patient is currently taking Trulicity 1.5 mg weekly. She reports that this is going well and she is not having any side effects. She does not test her blood sugar at home. Last A1c was 6.6% (3/26/24), which is at goal of <7%. Will increase back to Trulicity 3 mg now that it is back in stock. Patient will reach out to the pharmacy team if there are any questions/concerns before her next clinical pharmacy visit.      Plan:  INCREASE Trulicity to 3 mg weekly - Prescription sent to [facility] for home delivery

## 2024-06-24 NOTE — PROGRESS NOTES
Patient ID: Lashone D Lemon is a 53 y.o. female who presents for Diabetes.    Referring Provider: Dian Canales DO  PCP: Dian Canales DO Last visit with PCP: 3/26/24 Next visit with PCP: 6/27/24       Subjective   Treatment Adherence:   Patient did take her medications today.   Number of missed doses in last 7 days: 0     Preferred pharmacy: [facility]   Can patient afford prescribed medications: Yes    Diabetes  She presents for her follow-up diabetic visit. She has type 2 diabetes mellitus. Her disease course has been stable. There are no hypoglycemic associated symptoms. There are no diabetic associated symptoms. There are no hypoglycemic complications. Symptoms are stable. There are no diabetic complications. Risk factors for coronary artery disease include diabetes mellitus and dyslipidemia. Current diabetic treatments: GLP-1 RA. She is compliant with treatment all of the time. (Patient does not test blood glucose at home. ) An ACE inhibitor/angiotensin II receptor blocker is not being taken.     Objective     There were no vitals taken for this visit.   BP Readings from Last 4 Encounters:   03/26/24 107/74   02/08/24 117/80   12/19/23 120/80   11/10/23 124/84      There were no vitals filed for this visit.     Labs  Lab Results   Component Value Date    BILITOT 0.6 12/16/2023    CALCIUM 9.8 12/16/2023    CO2 25 12/16/2023     12/16/2023    CREATININE 0.95 12/16/2023    GLUCOSE 97 12/16/2023    ALKPHOS 72 12/16/2023    K 4.0 12/16/2023    PROT 7.7 12/16/2023     12/16/2023    AST 14 12/16/2023    ALT 14 12/16/2023    BUN 16 12/16/2023    ANIONGAP 14 12/16/2023    MG 2.08 06/27/2023    PHOS 3.7 05/19/2018    ALBUMIN 4.6 12/16/2023    GFRF >90 06/27/2023     Lab Results   Component Value Date    TRIG 50 12/16/2023    CHOL 149 12/16/2023    LDLCALC 80 12/16/2023    HDL 58.7 12/16/2023     Lab Results   Component Value Date    HGBA1C 6.6 (A) 03/26/2024        Current Outpatient Medications   Medication Instructions    atorvastatin (LIPITOR) 20 mg, oral, Daily    calcium carbonate-vitamin D3 600 mg-5 mcg (200 unit) tablet 1 tablet, oral, Daily    cholecalciferol (Vitamin D-3) 25 MCG (1000 UT) capsule 3 tablets, oral, Daily RT    clindamycin-benzoyl peroxide (Onexton) 1.2 %(1 % base) -3.75 % gel with pump 1 Application, topical (top), Every morning    clindamycin-tretinoin (Ziana) gel Topical, Nightly    cyanocobalamin (VITAMIN B-12) 100 mcg, oral, Daily    dicyclomine (BENTYL) 10 mg, oral, 3 times daily    fexofenadine (ALLEGRA) 60 mg, oral, Daily    fluconazole (DIFLUCAN) 150 mg, oral, Weekly    folic acid (Folvite) 1 mg tablet oral    omeprazole (PriLOSEC) 10 mg packet for oral suspension oral    spironolactone (ALDACTONE) 50 mg, oral, Daily    tacrolimus (Protopic) 0.1 % ointment Topical, 2 times daily, To areas of eczema on neck and face. Can also use for eczema on arms    triamcinolone (Kenalog) 0.1 % ointment Topical, 3 times weekly, For eczema on arms. Try alternating with Protopic    Trulicity 3 mg, subcutaneous, Once Weekly       Drug Interactions;  None requiring intervention at this time    Assessment/Plan   Problem List Items Addressed This Visit             ICD-10-CM    Diabetes mellitus without complication (Multi) E11.9     Patient is currently taking Trulicity 1.5 mg weekly. She reports that this is going well and she is not having any side effects. She does not test her blood sugar at home. Last A1c was 6.6% (3/26/24), which is at goal of <7%. Will increase back to Trulicity 3 mg now that it is back in stock. Patient will reach out to the pharmacy team if there are any questions/concerns before her next clinical pharmacy visit.      Plan:  INCREASE Trulicity to 3 mg weekly - Prescription sent to [facility] for home delivery          Relevant Medications    dulaglutide (Trulicity) 3 mg/0.5 mL pen injector    Other Relevant Orders    Follow Up  In [facility] - Pharmacy     Other Visit Diagnoses         Codes    Type 2 diabetes mellitus without complication, without long-term current use of insulin (Multi)     E11.9    Relevant Medications    dulaglutide (Trulicity) 3 mg/0.5 mL pen injector    Other Relevant Orders    Follow Up In [facility] - Pharmacy          Follow-up: 3 months on September 13th at 4:30PM     Time spent with pt: Total length of time 30 (minutes) of the encounter and more than 50% was spent counseling the patient.    Aide Pino, PharmD    Continue all meds under the continuation of care with the referring provider and clinical pharmacy team.    PATIENT EDUCATION/DISCUSSION:  - Counseled patient on MOA, expectations, side effects, duration of therapy, contraindications, administration, and monitoring parameters  - Answered all patient questions and concerns

## 2024-06-25 ENCOUNTER — PHARMACY VISIT (OUTPATIENT)
Dept: PHARMACY | Facility: CLINIC | Age: 54
End: 2024-06-25
Payer: MEDICARE

## 2024-06-27 ENCOUNTER — LAB (OUTPATIENT)
Dept: LAB | Facility: LAB | Age: 54
End: 2024-06-27
Payer: COMMERCIAL

## 2024-06-27 ENCOUNTER — APPOINTMENT (OUTPATIENT)
Dept: PRIMARY CARE | Facility: CLINIC | Age: 54
End: 2024-06-27
Payer: COMMERCIAL

## 2024-06-27 VITALS
HEIGHT: 66 IN | OXYGEN SATURATION: 98 % | TEMPERATURE: 97.2 F | BODY MASS INDEX: 28.93 KG/M2 | SYSTOLIC BLOOD PRESSURE: 123 MMHG | WEIGHT: 180 LBS | DIASTOLIC BLOOD PRESSURE: 76 MMHG | HEART RATE: 103 BPM

## 2024-06-27 DIAGNOSIS — E11.9 TYPE 2 DIABETES MELLITUS WITHOUT COMPLICATION, WITHOUT LONG-TERM CURRENT USE OF INSULIN (MULTI): ICD-10-CM

## 2024-06-27 DIAGNOSIS — E11.9 DIABETES MELLITUS WITHOUT COMPLICATION (MULTI): Primary | ICD-10-CM

## 2024-06-27 LAB
CREAT UR-MCNC: 144.1 MG/DL (ref 20–320)
MICROALBUMIN UR-MCNC: <7 MG/L
MICROALBUMIN/CREAT UR: NORMAL MG/G{CREAT}
POC HEMOGLOBIN A1C: 6.5 % (ref 4.2–6.5)

## 2024-06-27 PROCEDURE — 3078F DIAST BP <80 MM HG: CPT | Performed by: INTERNAL MEDICINE

## 2024-06-27 PROCEDURE — 99214 OFFICE O/P EST MOD 30 MIN: CPT | Performed by: INTERNAL MEDICINE

## 2024-06-27 PROCEDURE — 82043 UR ALBUMIN QUANTITATIVE: CPT

## 2024-06-27 PROCEDURE — 1036F TOBACCO NON-USER: CPT | Performed by: INTERNAL MEDICINE

## 2024-06-27 PROCEDURE — 82570 ASSAY OF URINE CREATININE: CPT

## 2024-06-27 PROCEDURE — RXMED WILLOW AMBULATORY MEDICATION CHARGE

## 2024-06-27 PROCEDURE — 3074F SYST BP LT 130 MM HG: CPT | Performed by: INTERNAL MEDICINE

## 2024-06-27 PROCEDURE — 83036 HEMOGLOBIN GLYCOSYLATED A1C: CPT | Performed by: INTERNAL MEDICINE

## 2024-06-27 RX ORDER — DULAGLUTIDE 4.5 MG/.5ML
4.5 INJECTION, SOLUTION SUBCUTANEOUS
Qty: 2 ML | Refills: 11 | Status: SHIPPED | OUTPATIENT
Start: 2024-06-30

## 2024-06-27 ASSESSMENT — PAIN SCALES - GENERAL: PAINLEVEL: 0-NO PAIN
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
## 2024-06-27 NOTE — ASSESSMENT & PLAN NOTE
Has been following with pharmacy team Aide with A1C not quite where she would like it to be at 6.5%.  Continues to make aggressive lifestyle modifications and interested in more aggressive treatment.  Will uptitrate to 4.5 mg  Last A1C slightly worse at 6.6% in the setting of some dietary indiscretions   On: Trulicity 3mg with brief reduction in dose due to availability  Albuminuria: 6/23 none ordered for repeat    Optho: 2/24 no retinopathy Dr. Behr   Podiatry: rescheduled with Dr. Isaias Noguera   Statin: on atorvastatin   Immunizations: UTD

## 2024-06-27 NOTE — PATIENT INSTRUCTIONS
Lashone,   Increase Trulicity to 4.5mg. I have sent this to our pharmacy   Stop by the lab for repeat urine studies

## 2024-07-03 ENCOUNTER — PHARMACY VISIT (OUTPATIENT)
Dept: PHARMACY | Facility: CLINIC | Age: 54
End: 2024-07-03
Payer: MEDICARE

## 2024-07-22 PROCEDURE — RXMED WILLOW AMBULATORY MEDICATION CHARGE

## 2024-07-23 ENCOUNTER — PHARMACY VISIT (OUTPATIENT)
Dept: PHARMACY | Facility: CLINIC | Age: 54
End: 2024-07-23
Payer: MEDICARE

## 2024-08-22 ENCOUNTER — PHARMACY VISIT (OUTPATIENT)
Dept: PHARMACY | Facility: CLINIC | Age: 54
End: 2024-08-22
Payer: MEDICARE

## 2024-08-22 PROCEDURE — RXMED WILLOW AMBULATORY MEDICATION CHARGE

## 2024-09-03 ENCOUNTER — DOCUMENTATION (OUTPATIENT)
Dept: PHYSICAL THERAPY | Facility: CLINIC | Age: 54
End: 2024-09-03
Payer: COMMERCIAL

## 2024-09-03 NOTE — PROGRESS NOTES
Physical Therapy    Discharge Summary    Name: Lashone D Lemon  MRN: [id number]  : 1970  Date: 24    Discharge Summary: PT    Discharge Information: Date of discharge 9-3-24, Date of last visit 24, Date of evaluation 24, Number of attended visits 1, Referred by Dr. Canales, and Referred for pelvic floor dysfunction    Discharge Status: unknown    Rehab Discharge Reason: Other pt had minimal functional impairment prior to evaluation

## 2024-09-10 ENCOUNTER — TELEMEDICINE (OUTPATIENT)
Dept: PHARMACY | Facility: HOSPITAL | Age: 54
End: 2024-09-10
Payer: COMMERCIAL

## 2024-09-10 DIAGNOSIS — E11.9 TYPE 2 DIABETES MELLITUS WITHOUT COMPLICATION, WITHOUT LONG-TERM CURRENT USE OF INSULIN (MULTI): ICD-10-CM

## 2024-09-10 DIAGNOSIS — E11.9 DIABETES MELLITUS WITHOUT COMPLICATION (MULTI): ICD-10-CM

## 2024-09-10 RX ORDER — DULAGLUTIDE 4.5 MG/.5ML
4.5 INJECTION, SOLUTION SUBCUTANEOUS
Qty: 2 ML | Refills: 11 | Status: SHIPPED | OUTPATIENT
Start: 2024-09-10

## 2024-09-10 ASSESSMENT — ENCOUNTER SYMPTOMS: DIABETIC ASSOCIATED SYMPTOMS: 0

## 2024-09-10 NOTE — PROGRESS NOTES
Patient ID: Lashone D Lemon is a 53 y.o. female who presents for Diabetes.    Referring Provider: Dian Canales DO  PCP: Dian Canales DO Last visit with PCP: 6/27/24 Next visit with PCP: 9/25/24       Subjective   Treatment Adherence:   Patient did take her medications today.   Number of missed doses in last 7 days: 0     Preferred pharmacy: [facility] for Trulicity   Can patient afford prescribed medications: Yes    Diabetes  She presents for her follow-up diabetic visit. She has type 2 diabetes mellitus. Her disease course has been stable. There are no hypoglycemic associated symptoms. There are no diabetic associated symptoms. There are no hypoglycemic complications. Symptoms are stable. There are no diabetic complications. Risk factors for coronary artery disease include diabetes mellitus and dyslipidemia. Current diabetic treatments: GLP-1 RA. She is compliant with treatment all of the time. (Patient does not test blood glucose at home. ) An ACE inhibitor/angiotensin II receptor blocker is not being taken.     Since the last clinical pharmacy visit her PCP has increased her Trulicity to 4.5 mg once weekly. She denied any side effects with the medication.     Objective     There were no vitals taken for this visit.   BP Readings from Last 4 Encounters:   06/27/24 123/76   03/26/24 107/74   02/08/24 117/80   12/19/23 120/80      There were no vitals filed for this visit.     Labs  Lab Results   Component Value Date    BILITOT 0.6 12/16/2023    CALCIUM 9.8 12/16/2023    CO2 25 12/16/2023     12/16/2023    CREATININE 0.95 12/16/2023    GLUCOSE 97 12/16/2023    ALKPHOS 72 12/16/2023    K 4.0 12/16/2023    PROT 7.7 12/16/2023     12/16/2023    AST 14 12/16/2023    ALT 14 12/16/2023    BUN 16 12/16/2023    ANIONGAP 14 12/16/2023    MG 2.08 06/27/2023    PHOS 3.7 05/19/2018    ALBUMIN 4.6 12/16/2023    GFRF >90 06/27/2023     Lab Results   Component Value Date    TRIG 50  12/16/2023    CHOL 149 12/16/2023    LDLCALC 80 12/16/2023    HDL 58.7 12/16/2023     Lab Results   Component Value Date    HGBA1C 6.5 06/27/2024       Current Outpatient Medications   Medication Instructions    atorvastatin (LIPITOR) 20 mg, oral, Daily    calcium carbonate-vitamin D3 600 mg-5 mcg (200 unit) tablet 1 tablet, oral, Daily    cholecalciferol (Vitamin D-3) 25 MCG (1000 UT) capsule 3 tablets, oral, Daily RT    clindamycin-benzoyl peroxide (Onexton) 1.2 %(1 % base) -3.75 % gel with pump 1 Application, topical (top), Every morning    clindamycin-tretinoin (Ziana) gel Topical, Nightly    cyanocobalamin (VITAMIN B-12) 100 mcg, oral, Daily    dicyclomine (BENTYL) 10 mg, oral, 3 times daily    fexofenadine (ALLEGRA) 60 mg, oral, Daily    fluconazole (DIFLUCAN) 150 mg, oral, Weekly    folic acid (Folvite) 1 mg tablet oral    omeprazole (PriLOSEC) 10 mg packet for oral suspension oral    spironolactone (ALDACTONE) 50 mg, oral, Daily    tacrolimus (Protopic) 0.1 % ointment Topical, 2 times daily, To areas of eczema on neck and face. Can also use for eczema on arms    triamcinolone (Kenalog) 0.1 % ointment Topical, 3 times weekly, For eczema on arms. Try alternating with Protopic    Trulicity 4.5 mg, subcutaneous, Once Weekly       Drug Interactions;  None requiring intervention at this time    Assessment/Plan   Problem List Items Addressed This Visit             ICD-10-CM    Diabetes mellitus without complication (Multi) E11.9     - Patient's diabetes is well controlled with an A1c of 6.5% (6/27/24) (goal <7%)   CONTINUE Trulicity 4.5 mg once weekly injection  Refills sent to  [facility]  CONTINUE making healthy diet and lifestyle choices   Will defer all care back to PCP at this time due to well controlled DM and max dose GLP1          Relevant Medications    dulaglutide (Trulicity) 4.5 mg/0.5 mL pen injector     Other Visit Diagnoses         Codes    Type 2 diabetes mellitus without complication, without  long-term current use of insulin (Multi)     E11.9    Relevant Medications    dulaglutide (Trulicity) 4.5 mg/0.5 mL pen injector          Follow up with Clinical Pharmacy Team as needed by patient or PCP    Time spent with pt: Total length of time 15 (minutes) of the encounter and more than 50% was spent counseling the patient.    Continue all meds under the continuation of care with the referring provider and clinical pharmacy team.    Please reach out to the Clinical Pharmacy Team if there are any further questions.     Verbal consent to manage patient's drug therapy was obtained from patient. They were informed they may decline to participate or withdraw from participation in pharmacy services at any time.    Hilary Giraldo, PharmD  Clinical Pharmacy Specialist   [phone]

## 2024-09-10 NOTE — ASSESSMENT & PLAN NOTE
- Patient's diabetes is well controlled with an A1c of 6.5% (6/27/24) (goal <7%)   CONTINUE Trulicity 4.5 mg once weekly injection  Refills sent to [facility]  CONTINUE making healthy diet and lifestyle choices   Will defer all care back to PCP at this time due to well controlled DM and max dose GLP1

## 2024-09-12 PROCEDURE — RXMED WILLOW AMBULATORY MEDICATION CHARGE

## 2024-09-13 ENCOUNTER — APPOINTMENT (OUTPATIENT)
Dept: PHARMACY | Facility: HOSPITAL | Age: 54
End: 2024-09-13
Payer: COMMERCIAL

## 2024-09-16 ENCOUNTER — PHARMACY VISIT (OUTPATIENT)
Dept: PHARMACY | Facility: CLINIC | Age: 54
End: 2024-09-16
Payer: MEDICARE
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
## 2024-09-24 ENCOUNTER — PHARMACY VISIT (OUTPATIENT)
Dept: PHARMACY | Facility: CLINIC | Age: 54
End: 2024-09-24
Payer: MEDICARE

## 2024-09-24 ENCOUNTER — APPOINTMENT (OUTPATIENT)
Dept: PRIMARY CARE | Facility: CLINIC | Age: 54
End: 2024-09-24
Payer: COMMERCIAL

## 2024-09-24 VITALS
HEART RATE: 82 BPM | SYSTOLIC BLOOD PRESSURE: 101 MMHG | OXYGEN SATURATION: 100 % | BODY MASS INDEX: 28.57 KG/M2 | DIASTOLIC BLOOD PRESSURE: 74 MMHG | WEIGHT: 177 LBS

## 2024-09-24 DIAGNOSIS — Z00.00 ENCOUNTER FOR ROUTINE ADULT HEALTH EXAMINATION WITHOUT ABNORMAL FINDINGS: ICD-10-CM

## 2024-09-24 DIAGNOSIS — E11.9 DIABETES MELLITUS WITHOUT COMPLICATION (MULTI): Primary | ICD-10-CM

## 2024-09-24 DIAGNOSIS — H61.23 HEARING LOSS DUE TO CERUMEN IMPACTION, BILATERAL: ICD-10-CM

## 2024-09-24 LAB — POC HEMOGLOBIN A1C: 6.8 % (ref 4.2–6.5)

## 2024-09-24 PROCEDURE — 3074F SYST BP LT 130 MM HG: CPT | Performed by: INTERNAL MEDICINE

## 2024-09-24 PROCEDURE — 69209 REMOVE IMPACTED EAR WAX UNI: CPT | Performed by: INTERNAL MEDICINE

## 2024-09-24 PROCEDURE — 99214 OFFICE O/P EST MOD 30 MIN: CPT | Performed by: INTERNAL MEDICINE

## 2024-09-24 PROCEDURE — RXMED WILLOW AMBULATORY MEDICATION CHARGE

## 2024-09-24 PROCEDURE — 83036 HEMOGLOBIN GLYCOSYLATED A1C: CPT | Performed by: INTERNAL MEDICINE

## 2024-09-24 PROCEDURE — 1036F TOBACCO NON-USER: CPT | Performed by: INTERNAL MEDICINE

## 2024-09-24 PROCEDURE — 3078F DIAST BP <80 MM HG: CPT | Performed by: INTERNAL MEDICINE

## 2024-09-24 PROCEDURE — 3062F POS MACROALBUMINURIA REV: CPT | Performed by: INTERNAL MEDICINE

## 2024-09-24 RX ORDER — EMPAGLIFLOZIN 10 MG/1
10 TABLET, FILM COATED ORAL DAILY
Qty: 100 TABLET | Refills: 3 | OUTPATIENT
Start: 2024-09-24

## 2024-09-24 ASSESSMENT — PAIN SCALES - GENERAL: PAINLEVEL: 6

## 2024-09-24 NOTE — ASSESSMENT & PLAN NOTE
On trulicity 4.5mg tolerating well, continues to make healthy eating choices.  But with worsening A1c to 6.8% despite these measures.    Add jardiance to regimen, potential side effects and management expectations reviewed including risk of urinary tract and genital mycotic infections, volume depletion. Advised to stay well hydrated.   No albuminuria 9/24   Optho: 2/24 no retinopathy Dr. Behr   Podiatry: rescheduled with Dr. Isaias Noguera   Statin: on atorvastatin   Immunizations: Flu shot and COVID booster recommended.       Orders:    POCT glycosylated hemoglobin (Hb A1C) manually resulted    empagliflozin (Jardiance) 10 mg; Take 1 tablet (10 mg) by mouth once daily.    Referral to Nutrition Services; Future

## 2024-09-24 NOTE — PATIENT INSTRUCTIONS
Lashone,   Add Jardiance 10mg once per day. Stay well hydrated on this medication.   Followup with Delia Iraheta     See you in 3 months for your physical

## 2024-09-25 ENCOUNTER — APPOINTMENT (OUTPATIENT)
Dept: PRIMARY CARE | Facility: CLINIC | Age: 54
End: 2024-09-25
Payer: COMMERCIAL

## 2024-09-27 DIAGNOSIS — K58.9 IRRITABLE BOWEL SYNDROME, UNSPECIFIED TYPE: ICD-10-CM

## 2024-09-29 DIAGNOSIS — K58.9 IRRITABLE BOWEL SYNDROME, UNSPECIFIED TYPE: ICD-10-CM

## 2024-09-29 RX ORDER — DICYCLOMINE HYDROCHLORIDE 10 MG/1
10 CAPSULE ORAL 3 TIMES DAILY
Qty: 90 CAPSULE | Refills: 1 | Status: SHIPPED | OUTPATIENT
Start: 2024-09-29

## 2024-10-07 RX ORDER — DICYCLOMINE HYDROCHLORIDE 10 MG/1
CAPSULE ORAL
Qty: 270 CAPSULE | OUTPATIENT
Start: 2024-10-07

## 2024-10-10 PROCEDURE — RXMED WILLOW AMBULATORY MEDICATION CHARGE

## 2024-10-11 ENCOUNTER — PHARMACY VISIT (OUTPATIENT)
Dept: PHARMACY | Facility: CLINIC | Age: 54
End: 2024-10-11
Payer: MEDICARE

## 2024-11-08 PROCEDURE — RXMED WILLOW AMBULATORY MEDICATION CHARGE

## 2024-11-09 ENCOUNTER — PHARMACY VISIT (OUTPATIENT)
Dept: PHARMACY | Facility: CLINIC | Age: 54
End: 2024-11-09
Payer: MEDICARE

## 2024-11-19 ENCOUNTER — TELEPHONE (OUTPATIENT)
Dept: DERMATOLOGY | Facility: CLINIC | Age: 54
End: 2024-11-19
Payer: COMMERCIAL

## 2024-11-21 ENCOUNTER — TELEPHONE (OUTPATIENT)
Dept: DERMATOLOGY | Facility: CLINIC | Age: 54
End: 2024-11-21
Payer: COMMERCIAL

## 2024-11-21 DIAGNOSIS — L70.0 ACNE VULGARIS: ICD-10-CM

## 2024-11-21 RX ORDER — SPIRONOLACTONE 50 MG/1
50 TABLET, FILM COATED ORAL DAILY
Qty: 90 TABLET | Refills: 3 | Status: SHIPPED | OUTPATIENT
Start: 2024-11-21

## 2024-11-21 NOTE — TELEPHONE ENCOUNTER
Pt called for refill for spironolactone. Per Dr. Brown's last noge 3/2024, plan was to continue spironolactone 50 mg daily and was re-filled for 90 day supply with 2 refills.     Pt denies any dizziness, muscle cramps, menstrual irregularities or breast tenderness. Re-emphasized the importance of avoiding pregnancy while on this medication due to teratogenicity. Pt expressed understanding.     Refill sent to pt's pharmacy.

## 2024-12-04 PROCEDURE — RXMED WILLOW AMBULATORY MEDICATION CHARGE

## 2024-12-06 ENCOUNTER — PHARMACY VISIT (OUTPATIENT)
Dept: PHARMACY | Facility: CLINIC | Age: 54
End: 2024-12-06
Payer: MEDICARE

## 2024-12-06 PROCEDURE — RXMED WILLOW AMBULATORY MEDICATION CHARGE

## 2024-12-07 ENCOUNTER — PHARMACY VISIT (OUTPATIENT)
Dept: PHARMACY | Facility: CLINIC | Age: 54
End: 2024-12-07
Payer: MEDICARE
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
## 2024-12-26 ENCOUNTER — LAB (OUTPATIENT)
Dept: LAB | Facility: LAB | Age: 54
End: 2024-12-26
Payer: COMMERCIAL

## 2024-12-26 ENCOUNTER — APPOINTMENT (OUTPATIENT)
Dept: PRIMARY CARE | Facility: CLINIC | Age: 54
End: 2024-12-26
Payer: COMMERCIAL

## 2024-12-26 VITALS
SYSTOLIC BLOOD PRESSURE: 106 MMHG | WEIGHT: 172 LBS | HEIGHT: 65 IN | DIASTOLIC BLOOD PRESSURE: 71 MMHG | HEART RATE: 79 BPM | OXYGEN SATURATION: 98 % | BODY MASS INDEX: 28.66 KG/M2 | TEMPERATURE: 97.4 F

## 2024-12-26 DIAGNOSIS — Z00.00 ENCOUNTER FOR PREVENTATIVE ADULT HEALTH CARE EXAMINATION: Primary | ICD-10-CM

## 2024-12-26 DIAGNOSIS — K58.9 IRRITABLE BOWEL SYNDROME, UNSPECIFIED TYPE: ICD-10-CM

## 2024-12-26 DIAGNOSIS — Z00.00 ENCOUNTER FOR PREVENTATIVE ADULT HEALTH CARE EXAMINATION: ICD-10-CM

## 2024-12-26 DIAGNOSIS — N32.81 OVERACTIVE BLADDER: ICD-10-CM

## 2024-12-26 DIAGNOSIS — K21.9 GERD WITHOUT ESOPHAGITIS: ICD-10-CM

## 2024-12-26 DIAGNOSIS — E11.9 DIABETES MELLITUS WITHOUT COMPLICATION (MULTI): ICD-10-CM

## 2024-12-26 DIAGNOSIS — E78.49 OTHER HYPERLIPIDEMIA: ICD-10-CM

## 2024-12-26 DIAGNOSIS — N60.99 ATYPICAL LOBULAR HYPERPLASIA (ALH) OF BREAST: ICD-10-CM

## 2024-12-26 PROBLEM — R39.15 URGENCY OF URINATION: Status: RESOLVED | Noted: 2024-05-21 | Resolved: 2024-12-26

## 2024-12-26 PROBLEM — R35.0 URINARY FREQUENCY: Status: RESOLVED | Noted: 2024-05-21 | Resolved: 2024-12-26

## 2024-12-26 LAB
25(OH)D3 SERPL-MCNC: 63 NG/ML (ref 30–100)
ALBUMIN SERPL BCP-MCNC: 4.7 G/DL (ref 3.4–5)
ALP SERPL-CCNC: 57 U/L (ref 33–110)
ALT SERPL W P-5'-P-CCNC: 15 U/L (ref 7–45)
ANION GAP SERPL CALC-SCNC: 13 MMOL/L (ref 10–20)
AST SERPL W P-5'-P-CCNC: 16 U/L (ref 9–39)
BASOPHILS # BLD AUTO: 0.02 X10*3/UL (ref 0–0.1)
BASOPHILS NFR BLD AUTO: 0.3 %
BILIRUB SERPL-MCNC: 0.4 MG/DL (ref 0–1.2)
BUN SERPL-MCNC: 17 MG/DL (ref 6–23)
CALCIUM SERPL-MCNC: 9.4 MG/DL (ref 8.6–10.6)
CHLORIDE SERPL-SCNC: 104 MMOL/L (ref 98–107)
CHOLEST SERPL-MCNC: 150 MG/DL (ref 0–199)
CHOLESTEROL/HDL RATIO: 2.7
CO2 SERPL-SCNC: 25 MMOL/L (ref 21–32)
CREAT SERPL-MCNC: 0.99 MG/DL (ref 0.5–1.05)
EGFRCR SERPLBLD CKD-EPI 2021: 68 ML/MIN/1.73M*2
EOSINOPHIL # BLD AUTO: 0.13 X10*3/UL (ref 0–0.7)
EOSINOPHIL NFR BLD AUTO: 2 %
ERYTHROCYTE [DISTWIDTH] IN BLOOD BY AUTOMATED COUNT: 13.2 % (ref 11.5–14.5)
EST. AVERAGE GLUCOSE BLD GHB EST-MCNC: 140 MG/DL
GLUCOSE SERPL-MCNC: 87 MG/DL (ref 74–99)
HBA1C MFR BLD: 6.5 %
HCT VFR BLD AUTO: 43.6 % (ref 36–46)
HDLC SERPL-MCNC: 55.8 MG/DL
HGB BLD-MCNC: 14.3 G/DL (ref 12–16)
IMM GRANULOCYTES # BLD AUTO: 0 X10*3/UL (ref 0–0.7)
IMM GRANULOCYTES NFR BLD AUTO: 0 % (ref 0–0.9)
LDLC SERPL CALC-MCNC: 83 MG/DL
LYMPHOCYTES # BLD AUTO: 3.95 X10*3/UL (ref 1.2–4.8)
LYMPHOCYTES NFR BLD AUTO: 61.5 %
MCH RBC QN AUTO: 31.4 PG (ref 26–34)
MCHC RBC AUTO-ENTMCNC: 32.8 G/DL (ref 32–36)
MCV RBC AUTO: 96 FL (ref 80–100)
MONOCYTES # BLD AUTO: 0.54 X10*3/UL (ref 0.1–1)
MONOCYTES NFR BLD AUTO: 8.4 %
NEUTROPHILS # BLD AUTO: 1.78 X10*3/UL (ref 1.2–7.7)
NEUTROPHILS NFR BLD AUTO: 27.8 %
NON HDL CHOLESTEROL: 94 MG/DL (ref 0–149)
NRBC BLD-RTO: 0 /100 WBCS (ref 0–0)
PLATELET # BLD AUTO: 312 X10*3/UL (ref 150–450)
POTASSIUM SERPL-SCNC: 4.5 MMOL/L (ref 3.5–5.3)
PROT SERPL-MCNC: 7.8 G/DL (ref 6.4–8.2)
RBC # BLD AUTO: 4.55 X10*6/UL (ref 4–5.2)
SODIUM SERPL-SCNC: 137 MMOL/L (ref 136–145)
TRIGL SERPL-MCNC: 57 MG/DL (ref 0–149)
TSH SERPL-ACNC: 1.09 MIU/L (ref 0.44–3.98)
VLDL: 11 MG/DL (ref 0–40)
WBC # BLD AUTO: 6.4 X10*3/UL (ref 4.4–11.3)

## 2024-12-26 PROCEDURE — 3078F DIAST BP <80 MM HG: CPT | Performed by: INTERNAL MEDICINE

## 2024-12-26 PROCEDURE — 80061 LIPID PANEL: CPT

## 2024-12-26 PROCEDURE — 84443 ASSAY THYROID STIM HORMONE: CPT

## 2024-12-26 PROCEDURE — 85025 COMPLETE CBC W/AUTO DIFF WBC: CPT

## 2024-12-26 PROCEDURE — 83036 HEMOGLOBIN GLYCOSYLATED A1C: CPT

## 2024-12-26 PROCEDURE — 3074F SYST BP LT 130 MM HG: CPT | Performed by: INTERNAL MEDICINE

## 2024-12-26 PROCEDURE — 1036F TOBACCO NON-USER: CPT | Performed by: INTERNAL MEDICINE

## 2024-12-26 PROCEDURE — 80053 COMPREHEN METABOLIC PANEL: CPT

## 2024-12-26 PROCEDURE — 3008F BODY MASS INDEX DOCD: CPT | Performed by: INTERNAL MEDICINE

## 2024-12-26 PROCEDURE — 82306 VITAMIN D 25 HYDROXY: CPT

## 2024-12-26 PROCEDURE — 99396 PREV VISIT EST AGE 40-64: CPT | Performed by: INTERNAL MEDICINE

## 2024-12-26 PROCEDURE — 3062F POS MACROALBUMINURIA REV: CPT | Performed by: INTERNAL MEDICINE

## 2024-12-26 RX ORDER — TAMOXIFEN CITRATE 10 MG/1
10 TABLET ORAL
COMMUNITY
Start: 2024-10-07 | End: 2025-02-05

## 2024-12-26 RX ORDER — DICYCLOMINE HYDROCHLORIDE 10 MG/1
10 CAPSULE ORAL 3 TIMES DAILY
Qty: 90 CAPSULE | Refills: 1 | Status: SHIPPED | OUTPATIENT
Start: 2024-12-26

## 2024-12-26 RX ORDER — ATORVASTATIN CALCIUM 20 MG/1
20 TABLET, FILM COATED ORAL DAILY
Qty: 90 TABLET | Refills: 3 | Status: SHIPPED | OUTPATIENT
Start: 2024-12-26

## 2024-12-26 ASSESSMENT — PAIN SCALES - GENERAL: PAINLEVEL_OUTOF10: 0-NO PAIN

## 2024-12-26 ASSESSMENT — PATIENT HEALTH QUESTIONNAIRE - PHQ9
SUM OF ALL RESPONSES TO PHQ9 QUESTIONS 1 & 2: 0
2. FEELING DOWN, DEPRESSED OR HOPELESS: NOT AT ALL
1. LITTLE INTEREST OR PLEASURE IN DOING THINGS: NOT AT ALL

## 2024-12-26 NOTE — ASSESSMENT & PLAN NOTE
On prilosec daily, maintained on preventive B12, no noted deficiencies in the past.        Follow-up every 3 months

## 2024-12-26 NOTE — PATIENT INSTRUCTIONS
Lashone,   Start some form resistance training   Goal 7-8 hours of uninterrupted sleep   Labs including bloodwork and/or urine is ordered today. The lab can be found on this floor (2nd floor) next to the pharmacy across from the elevators.    Followup 3 months

## 2024-12-26 NOTE — ASSESSMENT & PLAN NOTE
On trulicity 4.5mg and now Jardiance  last A1C 6.8% interested in rechecking   No albuminuria 9/24   Optho: 2/24 no retinopathy Dr. Behr   Podiatry: rescheduled with Dr. Isaias Noguera   Statin: on atorvastatin   Immunizations: UTD       Orders:    Follow Up In [facility] - PCP - Established; Future

## 2024-12-26 NOTE — ASSESSMENT & PLAN NOTE
Seen by breast clinic recommended continued monitoring and high risk surveillance with yearly MRI and mammogram.   On low dose tamoxifen, overall tolerating well.   Adequate follouwp with breast team.

## 2024-12-26 NOTE — ASSESSMENT & PLAN NOTE
On dicyclomine longstanding history since the age of 8 uses sparingly but helps with cramping discomfort, no changes in bowel movements, unclear if diagnosis of IBS.    Orders:    dicyclomine (Bentyl) 10 mg capsule; Take 1 capsule (10 mg) by mouth 3 times a day.

## 2025-01-04 PROCEDURE — RXMED WILLOW AMBULATORY MEDICATION CHARGE

## 2025-01-07 ENCOUNTER — PHARMACY VISIT (OUTPATIENT)
Dept: PHARMACY | Facility: CLINIC | Age: 55
End: 2025-01-07
Payer: MEDICARE

## 2025-01-27 ENCOUNTER — PHARMACY VISIT (OUTPATIENT)
Dept: PHARMACY | Facility: CLINIC | Age: 55
End: 2025-01-27
Payer: MEDICARE

## 2025-01-27 PROCEDURE — RXMED WILLOW AMBULATORY MEDICATION CHARGE

## 2025-02-09 ASSESSMENT — SLIT LAMP EXAM - LIDS
COMMENTS: GOOD POSITION
COMMENTS: GOOD POSITION

## 2025-02-09 ASSESSMENT — EXTERNAL EXAM - LEFT EYE: OS_EXAM: NORMAL

## 2025-02-09 ASSESSMENT — CUP TO DISC RATIO
OD_RATIO: .1
OS_RATIO: .1

## 2025-02-09 ASSESSMENT — EXTERNAL EXAM - RIGHT EYE: OD_EXAM: NORMAL

## 2025-02-09 NOTE — PROGRESS NOTES
Controlled diabetes mellitus type II without [id number]  -HbA1c= 6.5 (12/26/24). No diabetic retinopathy seen on exam. Continue close monitoring of blood glucose, blood pressure, and cholesterol. F/u 1 year for comprehensive exam.     Retinal hole or tear, [id number]  -OCT macula (11/30/22) - SS 10/10 OU.  Normal thickness and contour OU.  Intact IS-OS OU.  No edema OU.  254/244.  -Last saw Dr. Carlson and s/p retinopexy OU 9/23/20 - doing well.   -Exam today with good laser barrier OU with no new tears. Retinal detachment symptoms discussed.     Combined form of age-related cataract, right eyeH25.811  Combined form of age-related cataract, left eyeH25.812  -Not visually significant at this time. Monitor.     Family history of glaucoma  -(+)FH glaucoma - father, MGM, MGF  -Optic discs appear healthy (crowded/small, no obvious surface drusen), IOP WNL.   -OCT RNFL (2/12/25) - SS: 8/10 OD and 9/10 OS. OD: WNL. OS: Bord SN. 105/103.   -Low suspicion for glaucoma based on patient's exam and OCT RNFL. Monitor with annual dilated exams.     WcoriaC66.10  [id number]  [id number]  -Current glasses from 2023.   -New Rx for glasses given per patient request. Patient's signature obtained to acknowledge and confirm that a paper copy of glasses Rx was given to patient in compliance with [facility] Eyeglass Rule. Electronic copy of Rx will also be available via [employer].       No history of intraocular surgery/refractive surgery.   No FH of AMD

## 2025-02-12 ENCOUNTER — APPOINTMENT (OUTPATIENT)
Dept: OPHTHALMOLOGY | Facility: CLINIC | Age: 55
End: 2025-02-12
Payer: COMMERCIAL

## 2025-02-12 DIAGNOSIS — Z83.511 FAMILY HISTORY OF GLAUCOMA: ICD-10-CM

## 2025-02-12 DIAGNOSIS — E11.9 DIABETES MELLITUS WITHOUT COMPLICATION (MULTI): Primary | ICD-10-CM

## 2025-02-12 DIAGNOSIS — H52.203 ASTIGMATISM OF BOTH EYES, UNSPECIFIED TYPE: ICD-10-CM

## 2025-02-12 DIAGNOSIS — H33.303: ICD-10-CM

## 2025-02-12 DIAGNOSIS — H52.4 PRESBYOPIA: ICD-10-CM

## 2025-02-12 DIAGNOSIS — H52.13 MYOPIA OF BOTH EYES: ICD-10-CM

## 2025-02-12 DIAGNOSIS — H25.813 COMBINED FORM OF AGE-RELATED CATARACT, BOTH EYES: ICD-10-CM

## 2025-02-12 PROCEDURE — 92014 COMPRE OPH EXAM EST PT 1/>: CPT | Performed by: OPHTHALMOLOGY

## 2025-02-12 PROCEDURE — 92015 DETERMINE REFRACTIVE STATE: CPT | Performed by: OPHTHALMOLOGY

## 2025-02-12 PROCEDURE — 1036F TOBACCO NON-USER: CPT | Performed by: OPHTHALMOLOGY

## 2025-02-12 ASSESSMENT — ENCOUNTER SYMPTOMS
CARDIOVASCULAR NEGATIVE: 0
MUSCULOSKELETAL NEGATIVE: 0
ENDOCRINE NEGATIVE: 0
EYES NEGATIVE: 1
ALLERGIC/IMMUNOLOGIC NEGATIVE: 0
HEMATOLOGIC/LYMPHATIC NEGATIVE: 0
CONSTITUTIONAL NEGATIVE: 0
NEUROLOGICAL NEGATIVE: 0
PSYCHIATRIC NEGATIVE: 0
GASTROINTESTINAL NEGATIVE: 0
RESPIRATORY NEGATIVE: 0

## 2025-02-12 ASSESSMENT — REFRACTION_WEARINGRX
OD_AXIS: 145
OD_ADD: +1.75
OS_SPHERE: -2.50
OS_CYLINDER: -1.75
OS_AXIS: 015
OD_SPHERE: -2.75
OD_CYLINDER: -1.25
OS_ADD: +1.75

## 2025-02-12 ASSESSMENT — TONOMETRY
OS_IOP_MMHG: 15
OD_IOP_MMHG: 15
IOP_METHOD: GOLDMANN APPLANATION

## 2025-02-12 ASSESSMENT — REFRACTION_MANIFEST
OD_CYLINDER: -1.25
OS_CYLINDER: -1.75
OD_ADD: +2.00
OD_SPHERE: -2.75
OS_SPHERE: -2.50
OD_AXIS: 145
OS_AXIS: 015
OS_ADD: +2.00

## 2025-02-12 ASSESSMENT — VISUAL ACUITY
OS_CC: 20/20-
OD_CC: 20/20-
METHOD: SNELLEN - LINEAR
CORRECTION_TYPE: GLASSES

## 2025-02-23 DIAGNOSIS — K58.9 IRRITABLE BOWEL SYNDROME, UNSPECIFIED TYPE: ICD-10-CM

## 2025-02-24 RX ORDER — DICYCLOMINE HYDROCHLORIDE 10 MG/1
10 CAPSULE ORAL 3 TIMES DAILY
Qty: 90 CAPSULE | Refills: 1 | Status: SHIPPED | OUTPATIENT
Start: 2025-02-24

## 2025-02-28 PROCEDURE — RXMED WILLOW AMBULATORY MEDICATION CHARGE

## 2025-03-03 ENCOUNTER — PHARMACY VISIT (OUTPATIENT)
Dept: PHARMACY | Facility: CLINIC | Age: 55
End: 2025-03-03
Payer: MEDICARE

## 2025-03-05 ENCOUNTER — PATIENT MESSAGE (OUTPATIENT)
Dept: CARE COORDINATION | Facility: CLINIC | Age: 55
End: 2025-03-05
Payer: COMMERCIAL

## 2025-03-10 ENCOUNTER — PATIENT OUTREACH (OUTPATIENT)
Dept: CARE COORDINATION | Facility: CLINIC | Age: 55
End: 2025-03-10
Payer: COMMERCIAL

## 2025-03-13 ENCOUNTER — TELEMEDICINE CLINICAL SUPPORT (OUTPATIENT)
Dept: CARE COORDINATION | Facility: CLINIC | Age: 55
End: 2025-03-13
Payer: COMMERCIAL

## 2025-03-13 DIAGNOSIS — E11.9 DIABETES MELLITUS WITHOUT COMPLICATION (MULTI): ICD-10-CM

## 2025-03-13 NOTE — PROGRESS NOTES
INITIAL DIABETES EDUCATION VISIT    Reason for Nutrition Visit:  Pt is a 54 y.o. female being seen Virtual referred for  DM mgmt    Past Medical Hx:  Patient Active Problem List   Diagnosis    Diabetes mellitus without complication (Multi)    GERD without esophagitis    IBS (irritable bowel syndrome)    Fibromyalgia    Other hyperlipidemia    Acne vulgaris    Cataract, nuclear sclerotic, both eyes    Macrocytosis    Photopsia    Other hypertrophic disorders of the skin    Scar condition and fibrosis of skin    Chronic headaches    Chest pain, atypical    Chronic low back pain    Overactive bladder    Retinal hole or tear, bilateral    Combined form of age-related cataract, both eyes    Myopia of both eyes    Astigmatism of both eyes    Presbyopia    High-tone pelvic floor dysfunction    Atypical lobular hyperplasia (ALH) of breast    Family history of glaucoma        Diabetes related History:  Type of Diabetes: Type 2   What year were you diagnosed with diabetes? 2018  Family History: maternal + paternal side  Have you had diabetes education in the past? Unsure  What Concerns you most about having diabetes? Uncontrollable BGL    Health Status:  Tobacco: Smoking/Tobacco Use: No, patient does not smoke or use tobacco.  ETOH: Alcohol Use: No, patient does not drink alcohol.    Diabetes Self-Management Skills and Behaviors:   Do you exercise regularly?: No. Has treadmill at home for walking.  Physical Activity : walking  Yes  How do you manage your diabetes when you are sick?: call doctor    Current Medications:   Current Outpatient Medications on File Prior to Visit   Medication Sig Dispense Refill    atorvastatin (Lipitor) 20 mg tablet Take 1 tablet (20 mg) by mouth once daily. 90 tablet 3    calcium carbonate-vitamin D3 600 mg-5 mcg (200 unit) tablet Take 1 tablet by mouth once daily.      cholecalciferol (Vitamin D-3) 25 MCG (1000 UT) capsule Take 3 tablets by mouth once daily.      clindamycin-benzoyl peroxide  (Onexton) 1.2 %(1 % base) -3.75 % gel with pump Apply 1 Application topically once daily in the morning. 50 g 11    clindamycin-tretinoin (Ziana) gel Apply topically once daily at bedtime. 60 g 11    cyanocobalamin (Vitamin B-12) 1,000 mcg tablet Take 100 mcg by mouth once daily.      dicyclomine (Bentyl) 10 mg capsule TAKE 1 CAPSULE (10 MG) BY MOUTH 3 TIMES A DAY. 90 capsule 1    dulaglutide (Trulicity) 4.5 mg/0.5 mL pen injector Inject 4.5 mg under the skin 1 (one) time per week. 2 mL 11    empagliflozin (Jardiance) 10 mg Take 1 tablet (10 mg) by mouth once daily. 100 tablet 3    empagliflozin (Jardiance) 10 mg Take 1 tablet (10 mg) by mouth once daily. 100 tablet 3    fexofenadine (Allegra) 60 mg tablet Take 1 tablet (60 mg) by mouth once daily.      folic acid (Folvite) 1 mg tablet Take by mouth.      omeprazole (PriLOSEC) 10 mg packet for oral suspension Take by mouth.      spironolactone (Aldactone) 50 mg tablet Take 1 tablet (50 mg) by mouth once daily. 90 tablet 3    tacrolimus (Protopic) 0.1 % ointment Apply topically 2 times a day. To areas of eczema on neck and face. Can also use for eczema on arms 100 g 3    triamcinolone (Kenalog) 0.1 % ointment Apply topically 3 times a week. For eczema on arms. Try alternating with Protopic 80 g 3     No current facility-administered medications on file prior to visit.     Diabetes Medications: insulin pens and oral agents  Monitorng: None  Acute Complications-Safety: carries glucose    Hypoglycemia: None  Hypoglycemia Treatment: reviewed  Symptoms?No    Hyperglycemia: None  Hyperglycemia Treatment: reviewed  Symptoms?No    Labs:  Lab Results   Component Value Date    HGBA1C 6.5 (H) 12/26/2024    HGBA1C 6.8 (A) 09/24/2024    HGBA1C 6.5 06/27/2024     12/26/2024    K 4.5 12/26/2024     12/26/2024    CO2 25 12/26/2024    BUN 17 12/26/2024    CREATININE 0.99 12/26/2024    CALCIUM 9.4 12/26/2024    ALBUMIN 4.7 12/26/2024    PROT 7.8 12/26/2024    BILITOT 0.4  "12/26/2024    ALKPHOS 57 12/26/2024    ALT 15 12/26/2024    AST 16 12/26/2024    GLUCOSE 87 12/26/2024     Lab Results   Component Value Date    CHOL 150 12/26/2024    LDLCALC 83 12/26/2024    TRIG 57 12/26/2024    HDL 55.8 12/26/2024    LDLF 73 12/15/2022     Personal Care/ Health Care:  Has your Doc examined your feet in the last 12 months? Yes  How often do you check your feet?  Daily/weekly    Have you had a DM eye exam? Yes  Have you seen your dentist in the last 12 months? Yes    Anthropometrics:   Ht Readings from Last 1 Encounters:   12/26/24 1.655 m (5' 5.16\")      BMI Readings from Last 1 Encounters:   12/26/24 28.48 kg/m²      Wt Readings from Last 10 Encounters:   12/26/24 78 kg (172 lb)   09/24/24 80.3 kg (177 lb)   06/27/24 81.6 kg (180 lb)   03/26/24 83 kg (183 lb)   02/08/24 80.8 kg (178 lb 1.6 oz)   12/19/23 79.3 kg (174 lb 12.8 oz)   11/10/23 81.2 kg (179 lb)   10/09/23 81 kg (178 lb 9.2 oz)   10/03/23 82.7 kg (182 lb 4 oz)   08/23/23 84.9 kg (187 lb 2 oz)      Weight change:    Weight Change:   Significant Weight Change: No    Diabetes Assessment:   DM Interferes with other aspects of my life: disagree.  My level of stress is high: neutral.  I struggle with making changes in my life: disagree.  How do you handle stress? Family time  What are your current stressors? work    DSME - Meal Planning and Diet Recall  Are you currently following any meal plan? Low Carbohydrate    Does your culture or [place]ism require any of the following:  n/a  Who does the grocery shopping? patient and spouse  Who does the cooking in the home? patient  Food Allergies: None    Intolerance: None   Appetite: Normal     24 Diet Recall:  Meal 1: peanut butter + slice of honey wheat bread + fruit  Meal 2: tuna fish sandwich   Meal 3: baked chicken or fish + Uncle Bens rice + veggie  Snacks: light yogurt; Sunships   Beverages: coffee w/ splenda; water; Coke Zero  Eating out:  few times a week  Alcohol Intake:  none    Nutrition " Diagnosis:  Diagnosis Statement 1:  Diagnosis Status: New  Diagnosis : Altered nutrition related lab values  related to kidney, liver, cardiac, endocrine, neurologic, and/or pulmonary dysfunction as evidenced by elevated plasma glucose and/or HgbA1c levels     Nutrition Interventions:  Provided nutrition education on the following topic(s): Carbohydrate Counting, Complex Carbohydrates, Exercise, Intuitive Eating, Limited Added Sugars, Plate Method, and Physical Activity using ACONutritionCounseling: Goal Setting  Referred pt to Coordination of Care: None  Discussed with pt? Yes  Provided handouts on: Diabetes Self Management     DSME Topics  A1c Review  Understanding Diabetes Basics  Being Physically Active  Review Glycemic Goals (CGM or SMBG)  MyPlate Method    Nutrition Goals:  Commit to exercise at least 3 times per week. Start walking at 20 mins, then increase to 30 mins.   Avoid skipping lunch. If opting to skip a meal, refer to 30g CHO snack list from packet for a lighter meal.  Aim to have 30-50g CHO per meal and 15g CHO per snack.     Readiness to Change : Good  Level of Understanding : Good  Anticipated Compliant : Good    Follow up Plan  Will follow-up x 1 mo

## 2025-03-29 ENCOUNTER — PHARMACY VISIT (OUTPATIENT)
Dept: PHARMACY | Facility: CLINIC | Age: 55
End: 2025-03-29
Payer: MEDICARE

## 2025-03-29 PROCEDURE — RXMED WILLOW AMBULATORY MEDICATION CHARGE
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
## 2025-04-10 ENCOUNTER — APPOINTMENT (OUTPATIENT)
Dept: PRIMARY CARE | Facility: CLINIC | Age: 55
End: 2025-04-10
Payer: COMMERCIAL

## 2025-04-10 ENCOUNTER — PHARMACY VISIT (OUTPATIENT)
Dept: PHARMACY | Facility: CLINIC | Age: 55
End: 2025-04-10
Payer: MEDICARE

## 2025-04-10 VITALS
OXYGEN SATURATION: 98 % | WEIGHT: 178 LBS | SYSTOLIC BLOOD PRESSURE: 100 MMHG | DIASTOLIC BLOOD PRESSURE: 62 MMHG | HEART RATE: 80 BPM | BODY MASS INDEX: 29.48 KG/M2

## 2025-04-10 DIAGNOSIS — N60.99 ATYPICAL LOBULAR HYPERPLASIA (ALH) OF BREAST: ICD-10-CM

## 2025-04-10 DIAGNOSIS — K58.9 IRRITABLE BOWEL SYNDROME, UNSPECIFIED TYPE: ICD-10-CM

## 2025-04-10 DIAGNOSIS — E78.49 OTHER HYPERLIPIDEMIA: ICD-10-CM

## 2025-04-10 DIAGNOSIS — E11.9 DIABETES MELLITUS WITHOUT COMPLICATION: Primary | ICD-10-CM

## 2025-04-10 DIAGNOSIS — N95.1 VASOMOTOR SYMPTOMS DUE TO MENOPAUSE: ICD-10-CM

## 2025-04-10 LAB — POC HEMOGLOBIN A1C: 6.5 % (ref 4.2–6.5)

## 2025-04-10 PROCEDURE — 99214 OFFICE O/P EST MOD 30 MIN: CPT | Performed by: INTERNAL MEDICINE

## 2025-04-10 PROCEDURE — 3074F SYST BP LT 130 MM HG: CPT | Performed by: INTERNAL MEDICINE

## 2025-04-10 PROCEDURE — RXMED WILLOW AMBULATORY MEDICATION CHARGE

## 2025-04-10 PROCEDURE — 83036 HEMOGLOBIN GLYCOSYLATED A1C: CPT | Performed by: INTERNAL MEDICINE

## 2025-04-10 PROCEDURE — 3078F DIAST BP <80 MM HG: CPT | Performed by: INTERNAL MEDICINE

## 2025-04-10 PROCEDURE — 3044F HG A1C LEVEL LT 7.0%: CPT | Performed by: INTERNAL MEDICINE

## 2025-04-10 PROCEDURE — 1036F TOBACCO NON-USER: CPT | Performed by: INTERNAL MEDICINE

## 2025-04-10 RX ORDER — DICYCLOMINE HYDROCHLORIDE 10 MG/1
10 CAPSULE ORAL 3 TIMES DAILY
Qty: 90 CAPSULE | Refills: 1 | Status: CANCELLED | OUTPATIENT
Start: 2025-04-10

## 2025-04-10 RX ORDER — TAMOXIFEN CITRATE 10 MG/1
2.5 TABLET ORAL DAILY
COMMUNITY
Start: 2025-04-07

## 2025-04-10 RX ORDER — ATORVASTATIN CALCIUM 20 MG/1
20 TABLET, FILM COATED ORAL DAILY
Qty: 90 TABLET | Refills: 3 | Status: CANCELLED | OUTPATIENT
Start: 2025-04-10

## 2025-04-10 ASSESSMENT — PAIN SCALES - GENERAL: PAINLEVEL_OUTOF10: 0-NO PAIN
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
## 2025-04-10 NOTE — PATIENT INSTRUCTIONS
VISIT SUMMARY:  You came in today for a follow-up on your diabetes management and breast health. Your diabetes is well-managed with a stable HbA1c of 6.5%. You are also under regular surveillance for atypical lobular hyperplasia, with recent MRI results showing no abnormalities. We discussed your current medications and lifestyle, including your experience with mild menopausal symptoms and your efforts to maintain a healthy diet and manage stress.    YOUR PLAN:  -TYPE 2 DIABETES MELLITUS: Type 2 diabetes is a condition where your body does not use insulin properly, leading to high blood sugar levels. Your HbA1c is stable at 6.5%. We will increase your Jardiance to 25 mg daily to improve your blood sugar control. Continue taking Trulicity as prescribed. We will monitor your HbA1c every 3 months. Keep up with your lifestyle modifications and dietary management.    -ATYPICAL LOBULAR HYPERPLASIA: Atypical lobular hyperplasia is a condition where there are abnormal cells in the breast lobules, which can increase the risk of breast cancer. Your recent MRI showed no abnormalities. Continue taking tamoxifen 2.5 mg daily and follow up with your breast surgeon every 6 months. Keep up with your regular MRI surveillance.    -FATTY LIVER DISEASE: Fatty liver disease is a condition where fat builds up in the liver. You are at low risk for liver issues. Continue with your healthy diet and lifestyle. We will monitor your liver function tests annually.    -MENOPAUSAL SYMPTOMS: Menopause can cause symptoms like hot flashes due to hormonal changes. Your hot flashes are mild and do not require medication. Continue with lifestyle modifications to manage your symptoms, such as reducing caffeine intake.    -GENERAL HEALTH MAINTENANCE: You are up to date with your eye exams and have a dental appointment scheduled. Your vaccinations are current, and you have confirmed immunity to Hepatitis B. Continue with regular eye exams and attend your  dental appointment in May.    INSTRUCTIONS:  Please follow up in 3 months for your next HbA1c test. Continue your current medications and lifestyle modifications. Schedule your next MRI and follow up with your breast surgeon in 6 months. Maintain your healthy diet and manage stress as discussed.     Followup 3 months

## 2025-04-15 ENCOUNTER — APPOINTMENT (OUTPATIENT)
Dept: CARE COORDINATION | Facility: CLINIC | Age: 55
End: 2025-04-15
Payer: COMMERCIAL

## 2025-04-24 ENCOUNTER — PHARMACY VISIT (OUTPATIENT)
Dept: PHARMACY | Facility: CLINIC | Age: 55
End: 2025-04-24
Payer: MEDICARE

## 2025-04-24 PROCEDURE — RXMED WILLOW AMBULATORY MEDICATION CHARGE

## 2025-04-25 ENCOUNTER — APPOINTMENT (OUTPATIENT)
Dept: CARE COORDINATION | Facility: CLINIC | Age: 55
End: 2025-04-25
Payer: COMMERCIAL

## 2025-05-24 ENCOUNTER — PHARMACY VISIT (OUTPATIENT)
Dept: PHARMACY | Facility: CLINIC | Age: 55
End: 2025-05-24
Payer: MEDICARE

## 2025-05-24 PROCEDURE — RXMED WILLOW AMBULATORY MEDICATION CHARGE

## 2025-05-27 ENCOUNTER — TELEPHONE (OUTPATIENT)
Dept: DERMATOLOGY | Facility: CLINIC | Age: 55
End: 2025-05-27
Payer: COMMERCIAL

## 2025-05-30 ENCOUNTER — TELEPHONE (OUTPATIENT)
Dept: DERMATOLOGY | Facility: CLINIC | Age: 55
End: 2025-05-30
Payer: COMMERCIAL

## 2025-05-30 DIAGNOSIS — L70.0 ACNE VULGARIS: ICD-10-CM

## 2025-05-30 DIAGNOSIS — L20.89 OTHER ATOPIC DERMATITIS: ICD-10-CM

## 2025-05-30 RX ORDER — CLINDAMYCIN PHOSPHATE AND TRETINOIN GEL 1.2%/0.025% 10; .25 MG/G; MG/G
GEL TOPICAL NIGHTLY
Qty: 60 G | Refills: 0 | Status: SHIPPED | OUTPATIENT
Start: 2025-05-30 | End: 2026-05-30

## 2025-05-30 RX ORDER — TACROLIMUS 1 MG/G
OINTMENT TOPICAL 2 TIMES DAILY
Qty: 100 G | Refills: 0 | Status: SHIPPED | OUTPATIENT
Start: 2025-05-30 | End: 2026-05-30

## 2025-05-30 RX ORDER — TRIAMCINOLONE ACETONIDE 1 MG/G
OINTMENT TOPICAL 3 TIMES WEEKLY
Qty: 80 G | Refills: 0 | Status: SHIPPED | OUTPATIENT
Start: 2025-05-30

## 2025-05-30 RX ORDER — CLINDAMYCIN PHOSPHATE AND BENZOYL PEROXIDE 37.5; 1 MG/G; MG/G
1 GEL TOPICAL EVERY MORNING
Qty: 50 G | Refills: 0 | Status: SHIPPED | OUTPATIENT
Start: 2025-05-30

## 2025-05-30 NOTE — TELEPHONE ENCOUNTER
>1 year since last visit. Patient has appt scheduled for 7/2025. 1 refill of each medication requested sent.

## 2025-06-17 ENCOUNTER — PHARMACY VISIT (OUTPATIENT)
Dept: PHARMACY | Facility: CLINIC | Age: 55
End: 2025-06-17
Payer: MEDICARE

## 2025-06-17 PROCEDURE — RXMED WILLOW AMBULATORY MEDICATION CHARGE

## 2025-07-01 ENCOUNTER — TELEMEDICINE (OUTPATIENT)
Dept: DERMATOLOGY | Facility: CLINIC | Age: 55
End: 2025-07-01
Payer: COMMERCIAL

## 2025-07-01 DIAGNOSIS — L20.89 OTHER ATOPIC DERMATITIS: ICD-10-CM

## 2025-07-01 DIAGNOSIS — L70.0 ACNE VULGARIS: Primary | ICD-10-CM

## 2025-07-01 PROCEDURE — 99214 OFFICE O/P EST MOD 30 MIN: CPT | Performed by: DERMATOLOGY

## 2025-07-01 PROCEDURE — 1036F TOBACCO NON-USER: CPT | Performed by: DERMATOLOGY

## 2025-07-01 PROCEDURE — 3044F HG A1C LEVEL LT 7.0%: CPT | Performed by: DERMATOLOGY

## 2025-07-01 RX ORDER — TACROLIMUS 1 MG/G
OINTMENT TOPICAL 2 TIMES DAILY
Qty: 100 G | Refills: 3 | Status: SHIPPED | OUTPATIENT
Start: 2025-07-01 | End: 2026-07-01

## 2025-07-01 RX ORDER — CLINDAMYCIN PHOSPHATE AND TRETINOIN GEL 1.2%/0.025% 10; .25 MG/G; MG/G
GEL TOPICAL NIGHTLY
Qty: 60 G | Refills: 11 | Status: SHIPPED | OUTPATIENT
Start: 2025-07-01 | End: 2026-07-01

## 2025-07-01 RX ORDER — CLINDAMYCIN PHOSPHATE AND BENZOYL PEROXIDE 37.5; 1 MG/G; MG/G
1 GEL TOPICAL EVERY MORNING
Qty: 50 G | Refills: 11 | Status: SHIPPED | OUTPATIENT
Start: 2025-07-01

## 2025-07-01 RX ORDER — TRIAMCINOLONE ACETONIDE 1 MG/G
OINTMENT TOPICAL 3 TIMES WEEKLY
Qty: 80 G | Refills: 3 | Status: SHIPPED | OUTPATIENT
Start: 2025-07-02

## 2025-07-01 ASSESSMENT — DERMATOLOGY QUALITY OF LIFE (QOL) ASSESSMENT
RATE HOW BOTHERED YOU ARE BY SYMPTOMS OF YOUR SKIN PROBLEM (EG, ITCHING, STINGING BURNING, HURTING OR SKIN IRRITATION): 3
RATE HOW BOTHERED YOU ARE BY EFFECTS OF YOUR SKIN PROBLEMS ON YOUR ACTIVITIES (EG, GOING OUT, ACCOMPLISHING WHAT YOU WANT, WORK ACTIVITIES OR YOUR RELATIONSHIPS WITH OTHERS): 1
RATE HOW EMOTIONALLY BOTHERED YOU ARE BY YOUR SKIN PROBLEM (FOR EXAMPLE, WORRY, EMBARRASSMENT, FRUSTRATION): 1
RATE HOW BOTHERED YOU ARE BY EFFECTS OF YOUR SKIN PROBLEMS ON YOUR ACTIVITIES (EG, GOING OUT, ACCOMPLISHING WHAT YOU WANT, WORK ACTIVITIES OR YOUR RELATIONSHIPS WITH OTHERS): 1
WHAT SINGLE SKIN CONDITION LISTED BELOW IS THE PATIENT ANSWERING THE QUALITY-OF-LIFE ASSESSMENT QUESTIONS ABOUT: ACNE
WHAT SINGLE SKIN CONDITION LISTED BELOW IS THE PATIENT ANSWERING THE QUALITY-OF-LIFE ASSESSMENT QUESTIONS ABOUT: ACNE
RATE HOW EMOTIONALLY BOTHERED YOU ARE BY YOUR SKIN PROBLEM (FOR EXAMPLE, WORRY, EMBARRASSMENT, FRUSTRATION): 1
RATE HOW BOTHERED YOU ARE BY SYMPTOMS OF YOUR SKIN PROBLEM (EG, ITCHING, STINGING BURNING, HURTING OR SKIN IRRITATION): 3

## 2025-07-01 ASSESSMENT — PATIENT GLOBAL ASSESSMENT (PGA): WHAT IS THE PGA: PATIENT GLOBAL ASSESSMENT:  2 - MILD

## 2025-07-01 NOTE — PROGRESS NOTES
Subjective     Lashone D Lemon is a 54 y.o. female who presents for the following: Acne. Last derm visit 3/20/24 for acne and atopic dermatitis    She had lost track of time, work was incredibly busy, she did not realize it had been >1 year since last visit. Called 5/2025 for refills and one refill of each topical was sent. Since getting those refills her acne is getting better again. When she skips a night she flares a little. But when she is regular with her routine, no acne    Review of Systems:  No other skin or systemic complaints other than what is documented elsewhere in the note.    The following portions of the chart were reviewed this encounter and updated as appropriate:   Tobacco  Allergies  Meds  Problems  Med Hx  Surg Hx         Skin Cancer History  Biopsy Log Book  No skin cancers from Specimen Tracking.    Additional History      Specialty Problems          Dermatology Problems    Acne vulgaris    Other hypertrophic disorders of the skin    Scar condition and fibrosis of skin        Objective   Well appearing patient in no apparent distress; mood and affect are within normal limits.    A focused skin examination was performed. All findings within normal limits unless otherwise noted below.    Assessment/Plan   Skin Exam  1. ACNE VULGARIS  Head - Anterior (Face)  Clear on exam today  -Discussed the nature of the condition  - well-controlled, stay on same regimen    Labs: 12/26/24 K 4.5    Need to re-check Potassium before sending spironolactone refill  Related Procedures  Potassium  Follow Up In Dermatology - Established Patient  Related Medications  spironolactone (Aldactone) 50 mg tablet  Take 1 tablet (50 mg) by mouth once daily.  clindamycin-benzoyl peroxide (Onexton) 1.2 %(1 % base) -3.75 % gel with pump  Apply 1 Application topically once daily in the morning.  clindamycin-tretinoin (Ziana) gel  Apply topically once daily at bedtime.  2. OTHER ATOPIC DERMATITIS  Left Arm, Neck - Anterior,  Right Arm  Clear on exam today  - skin flares if she does not use triamcinolone 3x per week, she alternates with protopic      - continue gentle skin care  Related Medications  tacrolimus (Protopic) 0.1 % ointment  Apply topically 2 times a day. To areas of eczema on neck and face. Can also use for eczema on arms  triamcinolone (Kenalog) 0.1 % ointment  Apply topically 3 times a week. For eczema on arms. Try alternating with Protopic    Virtual or Telephone Consent    An interactive audio and video telecommunication system which permits real time communications between the patient (at the originating site) and provider (at the distant site) was utilized to provide this telehealth service.   Verbal consent was requested and obtained from Lashone D Lemon on this date, 07/01/25 for a telehealth visit and the patient's location was confirmed at the time of the visit.        Follow up 6 months acne virtual visit

## 2025-07-01 NOTE — Clinical Note
- skin flares if she does not use triamcinolone 3x per week, she alternates with protopic      - continue gentle skin care

## 2025-07-01 NOTE — Clinical Note
-Discussed the nature of the condition  - well-controlled, stay on same regimen    Labs: 12/26/24 K 4.5    Need to re-check Potassium before sending spironolactone refill

## 2025-07-02 ENCOUNTER — APPOINTMENT (OUTPATIENT)
Dept: DERMATOLOGY | Facility: CLINIC | Age: 55
End: 2025-07-02
Payer: COMMERCIAL

## 2025-07-03 LAB — POTASSIUM SERPL-SCNC: 4.2 MMOL/L (ref 3.5–5.3)

## 2025-07-19 ENCOUNTER — PHARMACY VISIT (OUTPATIENT)
Dept: PHARMACY | Facility: CLINIC | Age: 55
End: 2025-07-19
Payer: MEDICARE

## 2025-07-19 PROCEDURE — RXMED WILLOW AMBULATORY MEDICATION CHARGE

## 2025-07-20 DIAGNOSIS — K58.9 IRRITABLE BOWEL SYNDROME, UNSPECIFIED TYPE: ICD-10-CM

## 2025-07-21 RX ORDER — DICYCLOMINE HYDROCHLORIDE 10 MG/1
10 CAPSULE ORAL 3 TIMES DAILY
Qty: 90 CAPSULE | Refills: 1 | Status: SHIPPED | OUTPATIENT
Start: 2025-07-21 | End: 2025-07-23

## 2025-07-23 ENCOUNTER — APPOINTMENT (OUTPATIENT)
Dept: PRIMARY CARE | Facility: CLINIC | Age: 55
End: 2025-07-23
Payer: COMMERCIAL

## 2025-07-23 VITALS
HEART RATE: 88 BPM | OXYGEN SATURATION: 99 % | BODY MASS INDEX: 26.99 KG/M2 | WEIGHT: 163 LBS | DIASTOLIC BLOOD PRESSURE: 78 MMHG | SYSTOLIC BLOOD PRESSURE: 109 MMHG

## 2025-07-23 DIAGNOSIS — E11.9 DIABETES MELLITUS WITHOUT COMPLICATION: Primary | ICD-10-CM

## 2025-07-23 DIAGNOSIS — K58.9 IRRITABLE BOWEL SYNDROME, UNSPECIFIED TYPE: ICD-10-CM

## 2025-07-23 LAB — POC HEMOGLOBIN A1C: 6.3 % (ref 4.2–6.5)

## 2025-07-23 PROCEDURE — 3078F DIAST BP <80 MM HG: CPT | Performed by: INTERNAL MEDICINE

## 2025-07-23 PROCEDURE — 1036F TOBACCO NON-USER: CPT | Performed by: INTERNAL MEDICINE

## 2025-07-23 PROCEDURE — 99214 OFFICE O/P EST MOD 30 MIN: CPT | Performed by: INTERNAL MEDICINE

## 2025-07-23 PROCEDURE — 83036 HEMOGLOBIN GLYCOSYLATED A1C: CPT | Performed by: INTERNAL MEDICINE

## 2025-07-23 PROCEDURE — 3044F HG A1C LEVEL LT 7.0%: CPT | Performed by: INTERNAL MEDICINE

## 2025-07-23 PROCEDURE — 3074F SYST BP LT 130 MM HG: CPT | Performed by: INTERNAL MEDICINE

## 2025-07-23 RX ORDER — DICYCLOMINE HYDROCHLORIDE 10 MG/1
10 CAPSULE ORAL 3 TIMES DAILY
Qty: 180 CAPSULE | Refills: 3 | Status: SHIPPED | OUTPATIENT
Start: 2025-07-23

## 2025-07-23 ASSESSMENT — PAIN SCALES - GENERAL: PAINLEVEL_OUTOF10: 0-NO PAIN
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
## 2025-07-24 LAB
ALBUMIN/CREAT UR: 8 MG/G CREAT
CREAT UR-MCNC: 169 MG/DL (ref 20–275)
MICROALBUMIN UR-MCNC: 1.4 MG/DL

## 2025-08-11 DIAGNOSIS — E11.9 TYPE 2 DIABETES MELLITUS WITHOUT COMPLICATION, WITHOUT LONG-TERM CURRENT USE OF INSULIN: ICD-10-CM

## 2025-08-11 PROCEDURE — RXMED WILLOW AMBULATORY MEDICATION CHARGE

## 2025-08-11 RX ORDER — DULAGLUTIDE 4.5 MG/.5ML
4.5 INJECTION, SOLUTION SUBCUTANEOUS
Qty: 2 ML | Refills: 11 | Status: SHIPPED | OUTPATIENT
Start: 2025-08-11

## 2025-08-18 ENCOUNTER — PHARMACY VISIT (OUTPATIENT)
Dept: PHARMACY | Facility: CLINIC | Age: 55
End: 2025-08-18
Payer: MEDICARE

## 2025-10-28 ENCOUNTER — APPOINTMENT (OUTPATIENT)
Dept: PRIMARY CARE | Facility: CLINIC | Age: 55
End: 2025-10-28
Payer: COMMERCIAL

## 2026-02-18 ENCOUNTER — APPOINTMENT (OUTPATIENT)
Dept: OPHTHALMOLOGY | Age: 56
End: 2026-02-18
Payer: COMMERCIAL